# Patient Record
Sex: FEMALE | Race: WHITE | NOT HISPANIC OR LATINO | Employment: PART TIME | URBAN - METROPOLITAN AREA
[De-identification: names, ages, dates, MRNs, and addresses within clinical notes are randomized per-mention and may not be internally consistent; named-entity substitution may affect disease eponyms.]

---

## 2017-10-31 ENCOUNTER — LAB VISIT (OUTPATIENT)
Dept: LAB | Facility: HOSPITAL | Age: 62
End: 2017-10-31
Attending: INTERNAL MEDICINE
Payer: COMMERCIAL

## 2017-10-31 DIAGNOSIS — D72.819 LEUKOPENIA, UNSPECIFIED TYPE: ICD-10-CM

## 2017-10-31 LAB
ALBUMIN SERPL BCP-MCNC: 4.6 G/DL
ALP SERPL-CCNC: 47 U/L
ALT SERPL W/O P-5'-P-CCNC: 42 U/L
ANION GAP SERPL CALC-SCNC: 9 MMOL/L
AST SERPL-CCNC: 27 U/L
BASOPHILS # BLD AUTO: 0.04 K/UL
BASOPHILS NFR BLD: 0.9 %
BILIRUB SERPL-MCNC: 0.7 MG/DL
BUN SERPL-MCNC: 17 MG/DL
CALCIUM SERPL-MCNC: 10.6 MG/DL
CHLORIDE SERPL-SCNC: 99 MMOL/L
CO2 SERPL-SCNC: 29 MMOL/L
CREAT SERPL-MCNC: 0.44 MG/DL
DIFFERENTIAL METHOD: ABNORMAL
EOSINOPHIL # BLD AUTO: 0.1 K/UL
EOSINOPHIL NFR BLD: 2 %
ERYTHROCYTE [DISTWIDTH] IN BLOOD BY AUTOMATED COUNT: 12 %
EST. GFR  (AFRICAN AMERICAN): >60 ML/MIN/1.73 M^2
EST. GFR  (NON AFRICAN AMERICAN): >60 ML/MIN/1.73 M^2
GLUCOSE SERPL-MCNC: 101 MG/DL
HCT VFR BLD AUTO: 41.1 %
HGB BLD-MCNC: 13.9 G/DL
LYMPHOCYTES # BLD AUTO: 1.6 K/UL
LYMPHOCYTES NFR BLD: 35.4 %
MCH RBC QN AUTO: 32.4 PG
MCHC RBC AUTO-ENTMCNC: 33.8 G/DL
MCV RBC AUTO: 96 FL
MONOCYTES # BLD AUTO: 0.5 K/UL
MONOCYTES NFR BLD: 11.8 %
NEUTROPHILS # BLD AUTO: 2.3 K/UL
NEUTROPHILS NFR BLD: 49.9 %
NRBC BLD-RTO: 0 /100 WBC
PLATELET # BLD AUTO: 161 K/UL
PMV BLD AUTO: 10.8 FL
POTASSIUM SERPL-SCNC: 4.2 MMOL/L
PROT SERPL-MCNC: 7.8 G/DL
RBC # BLD AUTO: 4.29 M/UL
SODIUM SERPL-SCNC: 137 MMOL/L
TSH SERPL DL<=0.005 MIU/L-ACNC: 0.49 UIU/ML
VIT B12 SERPL-MCNC: >1000 PG/ML
WBC # BLD AUTO: 4.57 K/UL

## 2017-10-31 PROCEDURE — 82607 VITAMIN B-12: CPT | Mod: PN

## 2017-10-31 PROCEDURE — 85025 COMPLETE CBC W/AUTO DIFF WBC: CPT | Mod: PN

## 2017-10-31 PROCEDURE — 80053 COMPREHEN METABOLIC PANEL: CPT | Mod: PN

## 2017-10-31 PROCEDURE — 82607 VITAMIN B-12: CPT

## 2017-10-31 PROCEDURE — 85025 COMPLETE CBC W/AUTO DIFF WBC: CPT

## 2017-10-31 PROCEDURE — 84443 ASSAY THYROID STIM HORMONE: CPT

## 2017-10-31 PROCEDURE — 84443 ASSAY THYROID STIM HORMONE: CPT | Mod: PN

## 2017-10-31 PROCEDURE — 36415 COLL VENOUS BLD VENIPUNCTURE: CPT | Mod: PN

## 2017-10-31 PROCEDURE — 80053 COMPREHEN METABOLIC PANEL: CPT

## 2017-11-02 ENCOUNTER — TELEPHONE (OUTPATIENT)
Dept: CARDIOLOGY | Facility: CLINIC | Age: 62
End: 2017-11-02

## 2017-11-02 NOTE — TELEPHONE ENCOUNTER
----- Message from Juana Bueno RT sent at 11/2/2017  3:32 PM CDT -----  Contact: pt    pt , requesting a call back soon concerning an appt she thought she was suppose to have with Dr. Oseguera, 11/06/2017, Monday, thanks.

## 2020-08-13 ENCOUNTER — OFFICE VISIT (OUTPATIENT)
Dept: ORTHOPEDICS | Facility: CLINIC | Age: 65
End: 2020-08-13
Payer: COMMERCIAL

## 2020-08-13 VITALS — SYSTOLIC BLOOD PRESSURE: 144 MMHG | WEIGHT: 130 LBS | DIASTOLIC BLOOD PRESSURE: 76 MMHG | BODY MASS INDEX: 22.31 KG/M2

## 2020-08-13 DIAGNOSIS — S73.191A TEAR OF RIGHT ACETABULAR LABRUM, INITIAL ENCOUNTER: Primary | ICD-10-CM

## 2020-08-13 PROCEDURE — 99203 PR OFFICE/OUTPT VISIT, NEW, LEVL III, 30-44 MIN: ICD-10-PCS | Mod: S$GLB,,, | Performed by: ORTHOPAEDIC SURGERY

## 2020-08-13 PROCEDURE — 3008F PR BODY MASS INDEX (BMI) DOCUMENTED: ICD-10-PCS | Mod: S$GLB,,, | Performed by: ORTHOPAEDIC SURGERY

## 2020-08-13 PROCEDURE — 99203 OFFICE O/P NEW LOW 30 MIN: CPT | Mod: S$GLB,,, | Performed by: ORTHOPAEDIC SURGERY

## 2020-08-13 PROCEDURE — 3008F BODY MASS INDEX DOCD: CPT | Mod: S$GLB,,, | Performed by: ORTHOPAEDIC SURGERY

## 2020-08-13 RX ORDER — HYDROCHLOROTHIAZIDE 25 MG/1
25 TABLET ORAL DAILY
COMMUNITY
Start: 2020-06-20 | End: 2022-08-01

## 2020-08-13 RX ORDER — ESTRADIOL 0.75 MG/.75G
GEL TOPICAL
COMMUNITY
Start: 2020-08-12

## 2020-08-13 RX ORDER — POTASSIUM CHLORIDE 750 MG/1
10 TABLET, FILM COATED, EXTENDED RELEASE ORAL DAILY
COMMUNITY
Start: 2020-06-11 | End: 2022-08-01

## 2020-08-13 RX ORDER — ATORVASTATIN CALCIUM 40 MG/1
40 TABLET, FILM COATED ORAL DAILY
COMMUNITY
Start: 2020-07-11 | End: 2023-07-18

## 2020-08-13 NOTE — PROGRESS NOTES
[facility]    Subjective:     Chief Complaint:   Chief Complaint   Patient presents with    Right Hip - Pain     Right hip pain x 3 weeks. Noinjury       Past Medical History:   Diagnosis Date    Hyperlipemia        Past Surgical History:   Procedure Laterality Date    APPENDECTOMY         Current Outpatient Medications   Medication Sig    atorvastatin (LIPITOR) 40 MG tablet Take 40 mg by mouth once daily.    cyanocobalamin (VITAMIN B-12) 2000 MCG tablet Take 2,000 mcg by mouth once daily.    DIVIGEL 0.75 mg/0.75 gram (0.1%) GlPk     hydroCHLOROthiazide (HYDRODIURIL) 25 MG tablet Take 25 mg by mouth once daily.    KLOR-CON 10 10 mEq TbSR Take 10 mEq by mouth once daily.    magnesium oxide 400 mg Cap Take 1 tablet by mouth once daily.    multivitamin (ONE DAILY MULTIVITAMIN) per tablet Take 1 tablet by mouth once daily.    progesterone (PROMETRIUM) 100 MG capsule Take 100 mg by mouth once daily.    testosterone (ANDROGEL) 1 % (50 mg/5 gram) GlPk Apply topically once daily. Compound-Patient did not bring meds with her wasn't sure of strength.     No current facility-administered medications for this visit.        Review of patient's allergies indicates:  No Known Allergies    Family History   Problem Relation Age of Onset    Cancer Father     Cancer Paternal Aunt     Cancer Paternal Uncle        Social History     Socioeconomic History    Marital status:      Spouse name: Not on file    Number of children: Not on file    Years of education: Not on file    Highest education level: Not on file   Occupational History    Not on file   Social Needs    Financial resource strain: Not on file    Food insecurity     Worry: Not on file     Inability: Not on file    Transportation needs     Medical: Not on file     Non-medical: Not on file   Tobacco Use    Smoking status: Never Smoker    Smokeless tobacco: Never Used   Substance and Sexual Activity    Alcohol use: Yes      Alcohol/week: 2.0 - 3.0 standard drinks     Types: 2 - 3 Glasses of wine per week    Drug use: No    Sexual activity: Not on file   Lifestyle    Physical activity     Days per week: Not on file     Minutes per session: Not on file    Stress: Not on file   Relationships    Social connections     Talks on phone: Not on file     Gets together: Not on file     Attends Christianity service: Not on file     Active member of club or organization: Not on file     Attends meetings of clubs or organizations: Not on file     Relationship status: Not on file   Other Topics Concern    Not on file   Social History Narrative    Not on file       History of present illness:  Patient comes in today for the right hip.  She has had right hip pain for several months.  She initially thought she injured her back but now she is having severe groin pain.  She denies any recent trauma.      Review of Systems:    Constitution: Negative for chills, fever, and sweats.  Negative for unexplained weight loss.    HENT:  Negative for headaches and blurry vision.    Cardiovascular:Negative for chest pain or irregular heart beat. Negative for hypertension.    Respiratory:  Negative for cough and shortness of breath.    Gastrointestinal: Negative for abdominal pain, heartburn, melena, nausea, and vomitting.    Genitourinary:  Negative bladder incontinence and dysuria.    Musculoskeletal:  See HPI for details.     Neurological: Negative for numbness.    Psychiatric/Behavioral: Negative for depression.  The patient is not nervous/anxious.      Endocrine: Negative for polyuria    Hematologic/Lymphatic: Negative for bleeding problem.  Does not bruise/bleed easily.    Skin: Negative for poor would healing and rash    Objective:      Physical Examination:    Vital Signs:    Vitals:    08/13/20 1104   BP: (!) 144/76       Body mass index is 22.31 kg/m².    This a well-developed, well nourished patient in no acute distress.  They are alert and oriented  and cooperative to examination.        Patient has limited range of motion of the right hip.  She has a flexion contracture 15°.  She has full flexion.  She has severe pain with internal-external rotation of the right hip in the groin.  Negative straight leg raises.  EHL is intact deep tendon reflexes are intact.  Full range of motion of the left hip without difficulty.  Dorsalis pedis is 2/2 and equal bilaterally  Pertinent New Results:    XRAY Report / Interpretation:   AP and lateral the right hip demonstrates mild osteoarthritis of the right hip    Assessment/Plan:      Labral tear right hip.  I have ordered an MRI to better look at the labrum.  I will see her back with that the      This note was created using Dragon voice recognition software that occasionally misinterpreted phrases or words.

## 2020-08-19 ENCOUNTER — HOSPITAL ENCOUNTER (OUTPATIENT)
Dept: RADIOLOGY | Facility: HOSPITAL | Age: 65
Discharge: HOME | End: 2020-08-19
Attending: ORTHOPAEDIC SURGERY
Payer: COMMERCIAL

## 2020-08-19 DIAGNOSIS — S73.191A TEAR OF RIGHT ACETABULAR LABRUM, INITIAL ENCOUNTER: ICD-10-CM

## 2020-08-19 PROCEDURE — 73721 MRI JNT OF LWR EXTRE W/O DYE: CPT | Mod: TC,RT

## 2020-08-20 ENCOUNTER — OFFICE VISIT (OUTPATIENT)
Dept: ORTHOPEDICS | Facility: CLINIC | Age: 65
End: 2020-08-20
Payer: COMMERCIAL

## 2020-08-20 VITALS
DIASTOLIC BLOOD PRESSURE: 78 MMHG | WEIGHT: 130 LBS | HEIGHT: 64 IN | HEART RATE: 69 BPM | BODY MASS INDEX: 22.2 KG/M2 | SYSTOLIC BLOOD PRESSURE: 146 MMHG

## 2020-08-20 DIAGNOSIS — M67.951 TENDINOPATHY OF RIGHT GLUTEUS MEDIUS: Primary | ICD-10-CM

## 2020-08-20 DIAGNOSIS — M51.36 DISC DEGENERATION, LUMBAR: ICD-10-CM

## 2020-08-20 PROCEDURE — 99213 OFFICE O/P EST LOW 20 MIN: CPT | Mod: S$GLB,,, | Performed by: ORTHOPAEDIC SURGERY

## 2020-08-20 PROCEDURE — 3008F BODY MASS INDEX DOCD: CPT | Mod: S$GLB,,, | Performed by: ORTHOPAEDIC SURGERY

## 2020-08-20 PROCEDURE — 3008F PR BODY MASS INDEX (BMI) DOCUMENTED: ICD-10-PCS | Mod: S$GLB,,, | Performed by: ORTHOPAEDIC SURGERY

## 2020-08-20 PROCEDURE — 99213 PR OFFICE/OUTPT VISIT, EST, LEVL III, 20-29 MIN: ICD-10-PCS | Mod: S$GLB,,, | Performed by: ORTHOPAEDIC SURGERY

## 2020-08-20 RX ORDER — METHYLPREDNISOLONE 4 MG/1
TABLET ORAL
Qty: 1 PACKAGE | Refills: 0 | Status: SHIPPED | OUTPATIENT
Start: 2020-08-20 | End: 2023-07-18

## 2020-08-20 RX ORDER — TRAMADOL HYDROCHLORIDE 50 MG/1
50 TABLET ORAL EVERY 6 HOURS PRN
Qty: 28 TABLET | Refills: 0 | Status: SHIPPED | OUTPATIENT
Start: 2020-08-20 | End: 2020-08-27

## 2020-08-20 NOTE — PROGRESS NOTES
[facility]    Subjective:     Chief Complaint:   Chief Complaint   Patient presents with    Right Hip - Pain     Right hip pain/MRI results. States that her pain has gotten worse and that she is now having a pain that radiates down the front of her leg        Past Medical History:   Diagnosis Date    Hyperlipemia        Past Surgical History:   Procedure Laterality Date    APPENDECTOMY         Current Outpatient Medications   Medication Sig    atorvastatin (LIPITOR) 40 MG tablet Take 40 mg by mouth once daily.    cyanocobalamin (VITAMIN B-12) 2000 MCG tablet Take 2,000 mcg by mouth once daily.    DIVIGEL 0.75 mg/0.75 gram (0.1%) GlPk     hydroCHLOROthiazide (HYDRODIURIL) 25 MG tablet Take 25 mg by mouth once daily.    KLOR-CON 10 10 mEq TbSR Take 10 mEq by mouth once daily.    magnesium oxide 400 mg Cap Take 1 tablet by mouth once daily.    multivitamin (ONE DAILY MULTIVITAMIN) per tablet Take 1 tablet by mouth once daily.    progesterone (PROMETRIUM) 100 MG capsule Take 100 mg by mouth once daily.    testosterone (ANDROGEL) 1 % (50 mg/5 gram) GlPk Apply topically once daily. Compound-Patient did not bring meds with her wasn't sure of strength.     No current facility-administered medications for this visit.        Review of patient's allergies indicates:  No Known Allergies    Family History   Problem Relation Age of Onset    Cancer Father     Cancer Paternal Aunt     Cancer Paternal Uncle        Social History     Socioeconomic History    Marital status:      Spouse name: Not on file    Number of children: Not on file    Years of education: Not on file    Highest education level: Not on file   Occupational History    Not on file   Social Needs    Financial resource strain: Not on file    Food insecurity     Worry: Not on file     Inability: Not on file    Transportation needs     Medical: Not on file     Non-medical: Not on file   Tobacco Use    Smoking status: Never  Smoker    Smokeless tobacco: Never Used   Substance and Sexual Activity    Alcohol use: Yes     Alcohol/week: 2.0 - 3.0 standard drinks     Types: 2 - 3 Glasses of wine per week    Drug use: No    Sexual activity: Not on file   Lifestyle    Physical activity     Days per week: Not on file     Minutes per session: Not on file    Stress: Not on file   Relationships    Social connections     Talks on phone: Not on file     Gets together: Not on file     Attends Adventism service: Not on file     Active member of club or organization: Not on file     Attends meetings of clubs or organizations: Not on file     Relationship status: Not on file   Other Topics Concern    Not on file   Social History Narrative    Not on file       History of present illness:  Patient comes in today for the right hip.  Continues complain of severe right hip pain and pain going down her leg.  The pain is in the anterior aspect of the leg and does go below the knee.  She denies any back pain whatsoever.      Review of Systems:    Constitution: Negative for chills, fever, and sweats.  Negative for unexplained weight loss.    HENT:  Negative for headaches and blurry vision.    Cardiovascular:Negative for chest pain or irregular heart beat. Negative for hypertension.    Respiratory:  Negative for cough and shortness of breath.    Gastrointestinal: Negative for abdominal pain, heartburn, melena, nausea, and vomitting.    Genitourinary:  Negative bladder incontinence and dysuria.    Musculoskeletal:  See HPI for details.     Neurological: Negative for numbness.    Psychiatric/Behavioral: Negative for depression.  The patient is not nervous/anxious.      Endocrine: Negative for polyuria    Hematologic/Lymphatic: Negative for bleeding problem.  Does not bruise/bleed easily.    Skin: Negative for poor would healing and rash    Objective:      Physical Examination:    Vital Signs:    Vitals:    08/20/20 1107   BP: (!) 146/78   Pulse: 69        Body mass index is 22.31 kg/m².    This a well-developed, well nourished patient in no acute distress.  They are alert and oriented and cooperative to examination.        Patient has no pain to palpation along the thigh no masses.  She has negative straight leg raises.  She has severe pain with internal-external rotation of the right hip.  She has no pain to palpation along the lumbar spine.  Pertinent New Results:  MRI of the right hip is essentially normal.    AP and lateral of the lumbar spine demonstrates collapse of the L5-S1 disc interspace    Assessment/Plan:      Severe right hip pain.  Etiology is unclear.  Started her on a Medrol Dosepak and physical therapy.  I will see her back in 2 weeks.  I am extremely concerned that this pain is coming from her hip even though her MRI is negative.  Could consider a MRI with contrast.  Will check her back in 2 weeks      This note was created using Dragon voice recognition software that occasionally misinterpreted phrases or words.

## 2022-02-08 ENCOUNTER — IMMUNIZATION (OUTPATIENT)
Dept: PRIMARY CARE CLINIC | Facility: CLINIC | Age: 67
End: 2022-02-08
Payer: COMMERCIAL

## 2022-02-08 DIAGNOSIS — Z23 NEED FOR VACCINATION: Primary | ICD-10-CM

## 2022-02-08 PROCEDURE — 91305 COVID-19, MRNA, LNP-S, PF, 30 MCG/0.3 ML DOSE VACCINE (PFIZER): CPT | Mod: PBBFAC | Performed by: INTERNAL MEDICINE

## 2022-11-22 PROBLEM — D47.2 MONOCLONAL GAMMOPATHY: Status: ACTIVE | Noted: 2022-11-22

## 2022-11-22 PROBLEM — R80.9 PROTEINURIA: Status: ACTIVE | Noted: 2022-11-22

## 2022-11-22 PROBLEM — E83.52 HYPERCALCEMIA: Status: ACTIVE | Noted: 2022-11-22

## 2022-11-22 PROBLEM — M85.80 OSTEOPENIA: Status: ACTIVE | Noted: 2022-11-22

## 2024-05-16 DIAGNOSIS — C50.911 INVASIVE DUCTAL CARCINOMA OF BREAST, FEMALE, RIGHT: Primary | ICD-10-CM

## 2024-05-16 NOTE — PROGRESS NOTES
[facility] - Breast Surgery   History and Physical    Subjective:      Nery Sandoval is a 68 y.o. female     Found on MMG, no symptoms      05/16/24 Genetics collected during Dr. Powell consult    Relevant medical history:  Monoclonal Gammopathy - Dr. Gasca   Proteinuria -  Dr. Kebede   Prometrium, Testosterone and Divigel x 20 years- stopped with diagnosis    Relevant info:Patient  Javier is with patient at today's appt.       Family history of cancer:Paternal aunt breast cancer. Two of patient paternal Uncle colon cancer.           Menarche at age 16 years old.  LMP 40ish. OCP-20 years. Hysterectomy no  Menopause at 42ish. HRT-yes estrogen progesterone 20 years   Personal history of ovarian/breast none . Denies previous breast biopsy. Patient had skin cancer before.         Never Smoker   4 Covid-19 vaccine left arm        Patient Active Problem List   Diagnosis    Proteinuria    Hypercalcemia    Osteopenia    Invasive ductal carcinoma of breast, female, right     Current Outpatient Medications on File Prior to Visit   Medication Sig Dispense Refill    calcitonin, salmon, (FORTICAL) 200 unit/actuation nasal spray 1 SPRAY BY NASAL ROUTE ONCE DAILY. ALTERNATE NOSTRILS 11.1 each 3    losartan (COZAAR) 100 MG tablet Take 100 mg by mouth once daily.      rosuvastatin (CRESTOR) 40 MG Tab Take 10 mg by mouth every evening.      CMPD testosterone proprionate 2% in vanicream  (Patient not taking: Reported on 5/20/2024)      COVID-19 vac, andra,Pfizer,,PF, (PFIZER COVID-19) 30 mcg/0.3 mL injection [email or web address] COVID-19 Vaccine (PF) 30 mcg/0.3 mL IM susp (purple)   Inject by intramuscular route. (Patient not taking: Reported on 5/20/2024)      cyanocobalamin 2000 MCG tablet Take 2,000 mcg by mouth once daily. (Patient not taking: Reported on 5/20/2024)      DIVIGEL 0.75 mg/0.75 gram (0.1%) GlPk  (Patient not taking: Reported on 5/20/2024)      magnesium oxide 400 mg Cap Take 1 tablet by mouth  once daily. (Patient not taking: Reported on 5/20/2024)      MIRALAX 17 gram/dose powder Take by mouth. (Patient not taking: Reported on 5/20/2024)      pantoprazole (PROTONIX) 40 MG tablet Take 40 mg by mouth once daily. (Patient not taking: Reported on 5/20/2024)      progesterone (PROMETRIUM) 200 MG capsule Take 200 mg by mouth once daily. (Patient not taking: Reported on 5/20/2024)      scopolamine (TRANSDERM-SCOP) 1.3-1.5 mg (1 mg over 3 days) PLACE 1 PATCH ONTO THE SKIN EVERY THIRD DAY. (Patient not taking: Reported on 5/20/2024)      testosterone (ANDROGEL) 1 % (50 mg/5 gram) GlPk Apply topically once daily. Compound-Patient did not bring meds with her wasn't sure of strength. (Patient not taking: Reported on 5/20/2024)      valACYclovir (VALTREX) 1000 MG tablet Take 1,000 mg by mouth 3 (three) times daily. (Patient not taking: Reported on 5/20/2024)       Current Facility-Administered Medications on File Prior to Visit   Medication Dose Route Frequency Provider Last Rate Last Admin    LIDOcaine-EPINEPHrine (PF) 1%-1:200,000 injection 5 mL  5 mL Subcutaneous Once Bhumika Hernandez MD         Review of patient's allergies indicates:  No Known Allergies  Past Medical History:   Diagnosis Date    Hyperlipemia      Past Surgical History:   Procedure Laterality Date    APPENDECTOMY       Family History   Problem Relation Name Age of Onset    Cancer Father          colon    Cancer Paternal Aunt          breast    Cancer Paternal Uncle          colon     Social History     Socioeconomic History    Marital status:    Tobacco Use    Smoking status: Never    Smokeless tobacco: Never   Substance and Sexual Activity    Alcohol use: Yes     Alcohol/week: 2.0 - 3.0 standard drinks of alcohol     Types: 2 - 3 Glasses of wine per week    Drug use: No         Review of Systems    No CP, No SOB      Objective:      /76 (BP Location: Left arm, Patient Position: Sitting, BP Method: Medium (Automatic))   Pulse 65   " Temp 97.3 °F (36.3 °C) (Temporal)   Resp 16   Ht 5' 4" (1.626 m)   Wt 61.8 kg (136 lb 3.9 oz)   SpO2 100%   BMI 23.39 kg/m²     Constitutional: NAD AAOX4  HEENT: EOMI, nonicteric sclera  NECK: no mass, no thyromegaly, no lymphadenopathy  CV: regular rate  PULM: good respiratory effort  ABDOMEN: soft, Nontender, nondistended. No guarding. No rebound.  MUSCULOSKELETAL: Good ROM  SKIN: No rashes or ulcerations seen.   NEUROLOGIC: CN grossly intact. Motor, sensory grossly intact    Breast exam   D moderate ptosis  Right: No discharge, dimpling, lymphadenopathy  Post biopsy bruise. Right UOQ 2cm FN 2cm movable bruise area  Left:  No mass, discharge, dimpling, lymphadenopathy      No visits with results within 6 Week(s) from this visit.   Latest known visit with results is:   Lab Visit on 04/04/2024   Component Date Value Ref Range Status    Glucose 04/04/2024 93  70 - 110 mg/dL Final    Comment: The ADA recommends the following guidelines for fasting glucose:    Normal:       less than 100 mg/dL    Prediabetes:  100 mg/dL to 125 mg/dL    Diabetes:     126 mg/dL or higher      Sodium 04/04/2024 136  136 - 145 mmol/L Final    Potassium 04/04/2024 4.1  3.5 - 5.1 mmol/L Final    Anion Gap reference range revised on 4/28/2023    Chloride 04/04/2024 105  95 - 110 mmol/L Final    CO2 04/04/2024 23  22 - 31 mmol/L Final    BUN 04/04/2024 15  7 - 18 mg/dL Final    Calcium 04/04/2024 10.9 (H)  8.4 - 10.2 mg/dL Final    Creatinine 04/04/2024 0.57  0.50 - 1.40 mg/dL Final    Albumin 04/04/2024 4.2  3.5 - 5.2 g/dL Final    Phosphorus 04/04/2024 3.1  2.7 - 4.5 mg/dL Final    eGFR 04/04/2024 >60  >60 mL/min/1.73 m^2 Final    Anion Gap 04/04/2024 8  5 - 12 mmol/L Final    Anion Gap reference range revised on 4/28/2023    WBC 04/04/2024 4.34  3.90 - 12.70 K/uL Final    RBC 04/04/2024 3.94 (L)  4.00 - 5.40 M/uL Final    Hemoglobin 04/04/2024 12.5  12.0 - 16.0 g/dL Final    Hematocrit 04/04/2024 37.5  37.0 - 48.5 % Final    MCV " 04/04/2024 95  82 - 98 fL Final    MCH 04/04/2024 31.7 (H)  27.0 - 31.0 pg Final    MCHC 04/04/2024 33.3  32.0 - 36.0 g/dL Final    RDW 04/04/2024 12.0  11.5 - 14.5 % Final    Platelets 04/04/2024 156  150 - 450 K/uL Final    MPV 04/04/2024 10.5  9.2 - 12.9 fL Final    Immature Granulocytes 04/04/2024 0.2  0.0 - 0.5 % Final    Gran # (ANC) 04/04/2024 2.1  1.8 - 7.7 K/uL Final    Immature Grans (Abs) 04/04/2024 0.01  0.00 - 0.04 K/uL Final    Comment: Mild elevation in immature granulocytes is non specific and   can be seen in a variety of conditions including stress response,   acute inflammation, trauma and pregnancy. Correlation with other   laboratory and clinical findings is essential.      Lymph # 04/04/2024 1.7  1.0 - 4.8 K/uL Final    Mono # 04/04/2024 0.5  0.3 - 1.0 K/uL Final    Eos # 04/04/2024 0.1  0.0 - 0.5 K/uL Final    Baso # 04/04/2024 0.03  0.00 - 0.20 K/uL Final    nRBC 04/04/2024 0  0 /100 WBC Final    Gran % 04/04/2024 48.4  38.0 - 73.0 % Final    Lymph % 04/04/2024 38.5  18.0 - 48.0 % Final    Mono % 04/04/2024 10.8  4.0 - 15.0 % Final    Eosinophil % 04/04/2024 1.4  0.0 - 8.0 % Final    Basophil % 04/04/2024 0.7  0.0 - 1.9 % Final    Differential Method 04/04/2024 Automated   Final    Protein, Urine Random 04/04/2024 36 (H)  0 - 11 mg/dL Final    Creatinine, Urine 04/04/2024 15.1  15.0 - 325.0 mg/dL Final    Prot/Creat Ratio, Urine 04/04/2024 2384.1 (H)  10.0 - 107.0 mg/g Final    Specimen UA 04/04/2024 Urine, Clean Catch   Final    Color, UA 04/04/2024 Yellow  Yellow, Straw, Flor Final    Appearance, UA 04/04/2024 Clear  Clear Final    pH, UA 04/04/2024 6.5  5.0 - 8.0 Final    Specific Gravity, UA 04/04/2024 <=1.005 (A)  1.005 - 1.030 Final    Protein, UA 04/04/2024 Negative  Negative Final    Comment: Recommend a 24 hour urine protein or a urine   protein/creatinine ratio if globulin induced proteinuria is  clinically suspected.      Glucose, UA 04/04/2024 Negative  Negative Final    Ketones,  UA 04/04/2024 Negative  Negative Final    Bilirubin (UA) 04/04/2024 Negative  Negative Final    Occult Blood UA 04/04/2024 Negative  Negative Final    Nitrite, UA 04/04/2024 Negative  Negative Final    Urobilinogen, UA 04/04/2024 0.2  <2.0 EU/dL Final    Leukocytes, UA 04/04/2024 Negative  Negative Final    Magnesium 04/04/2024 2.2  1.6 - 2.6 mg/dL Final    Total Protein, Electrophoresis 04/04/2024 6.9  6.3 - 8.2 g/dL Final    Albumin, Electrophoresis 04/04/2024 4.24  3.75 - 5.01 g/dL Final    Alpha 1-Globulin, Electrophoresis 04/04/2024 0.28  0.19 - 0.46 g/dL Final    Alpha 2-Globulin, Electrophoresis 04/04/2024 0.72  0.48 - 1.05 g/dL Final    Beta-Globulin, Electrophoresis 04/04/2024 0.81  0.48 - 1.10 g/dL Final    Gamma-Globulin, Electrophoresis 04/04/2024 0.86  0.62 - 1.51 g/dL Final    Immunofix Interp. 04/04/2024 AMAYA Done   Final    SPE Interp. 04/04/2024 See Note   Final    Comment: Restricted band of protein migration in the gamma region   which is too small to quantify. AMAYA gel shows a faint band   in IgM kappa which may be indicative of a specific immune   response or an early monoclonal protein. Close clinical   correlation with AMAYA follow-up is suggested, if clinically   indicated.      MONOCLONAL PROTEIN 04/04/2024 Not Applicable  <=0.00 g/dL Final    EER Monoclonal Protein Detect 04/04/2024 See Note   Final    Comment: Authorized individuals can access the Dopplr   Enhanced Report using the following link:     [email or web address]  Performed By: [facility]  [address] [place] [address]  : Alexander Bustamante MD, PhD  CLIA Number: [id number]      [place] Free Light Chains 04/04/2024 16.66  3.30 - 19.40 mg/L Final    Comment: INTERPRETIVE INFORMATION: Kappa Qnt Free Light Chains  Undetected antigen excess is a rare event but cannot be   excluded. Free light chain results should always be   interpreted in conjunction with other clinical and    laboratory findings.      Lambda Free Light Chains 04/04/2024 10.24  5.71 - 26.30 mg/L Final    Comment: INTERPRETIVE INFORMATION: Lambda Qnt Free Light Chains  Undetected antigen excess is a rare event but cannot be   excluded. Free light chain results should always be   interpreted in conjunction with other clinical and   laboratory findings.      Kappa/Lambda FLC Ratio 04/04/2024 1.63  0.26 - 1.65 Final    Comment: Performed By: [facility]  [address] [place] [id number]  : Alexander Bustamante MD, PhD  CLIA Number: [id number]           Patient Active Problem List   Diagnosis    Proteinuria    Hypercalcemia    Osteopenia    Invasive ductal carcinoma of breast, female, right        High complexity of problems addressed by Dr. Eastman - breast cancer, treatment options, expectations, effects of treatment, risks, benefits. Extensive and complex data reviewed, independent interpretation of tests, discussion of management with another health care provider.    05/20/2024 reviewed all complex data.  MA/NP functioned as a scribe.  Services and exam were personally performed, decisions made, complex data reviewed by Dr. Eastman.      Alternative efficacious methods of treatment of breast cancer were given.  Options including lumpectomy, mastectomy, reconstruction options with advantages/disadvantages of each, provisions assuring coverage of reconstructive surgery costs, how the patient may access reconstructive care including the potential to be transferred to a facility that provides reconstructive care or choosing reconstruction after completion of breast cancer surgery and treatment.  LDH, LCLTF breast cancer brochure given.    Evidence based NCCN and MD Sandoval guidelines used for treatment planning.     Saw in Multi Disciplinary clinic with Dr Carver medical oncologist.    I have given her all options - lumpectomy XRT, unilateral or bilateral mastectomy +/- reconstruction. I  have explained procedure, risks, benefits, postop expectations. All questions answered. Risks include but are not limited to infection, bleeding, injury to nerves, vessels, numbness, weakness, difficulty lifting arm, swelling of arm (lymphedema), inability to remove all lesion, residual breast tissue, recurrence of malignancy, need for further procedure, loss of skin flap, seroma (fluid collection), inability to find sentinel lymph node, need for axillary dissection, chronic pain, radioactive substance may be given, allergic reaction, staining of the skin, difficulty with future imaging, close or positive margins requiring additional surgery, vascular clotting, pulmonary embolus.            Imaging and Chronology:     3/1/2023 Bilateral Screening Mammo - [facility]   No mammographic evidence of malignancy.   BI-RADS Category: Overall: 1 - Negative     4/23/2024 Bilateral Screening Mammo - [facility]   Left Nml   Right breast calcifications at the upper outer anterior position. Assessment: 0 - Incomplete. Special Views: Magnification View is recommended.    BI-RADS Category: Overall: 0 - Incomplete: Needs Additional Imaging Evaluation     5/1/2024 Right Dx Mammo, Right Complete US - [facility]   Mammo Digital Diagnostic:  Right 9:00 anterior depth, there is a group of pleomorphic calcifications spanning 4 mm, possibly a calcifying papilloma.  There are other milk of calcium calcifications in the right upper outer anterior breast elsewhere.     US Breast Right Complete  There is retroareolar duct ectasia.  There is a cyst at the right 9:00, 7 cm from the nipple, measuring 4 x 3 x 4 mm.     At the right 9:00, 2 cm from the nipple, there is an intraductal heterogeneous mass with associated calcifications measuring 5 x 3 x 4 mm, which corresponds to the mammographic finding. Biopsy recommended.      No other sonographic suspicious lesions are seen. No abnormal lymph nodes are seen in the axilla.      BI-RADS Category: Overall: 4 - Suspicious      5/8/2024 Right Breast 9 O'C, 2 CFN, CNB, Infinity Clip - [facility]  Grade 1 IDC (1,1,1)  DCIS Intermediate Grade     ER 93.8    SD  93.2    HER2 (1+) neg Ki67  13.7      5/15/2024 MRI Breast - [facility]   At the right 10:00 anterior depth, there is irregular enhancement lateral adjacent to a biopsy clip measuring 5 x 3 x 2 mm with vague, subtle, below threshold enhancement inferior adjacent to the clip measuring 9 x 7 mm, which may be an underestimation of the extent of disease.  During the biopsy, the suspicious area seemed larger. Because the malignancy is low grade, it may not enhance. Recommend wide excision.      BI-RADS Category: Overall: 6 - Known Biopsy-Proven Malignancy          Assessment/Plan:      Cancer Staging   Invasive ductal carcinoma of breast, female, right  Staging form: Breast, AJCC 8th Edition  - Clinical stage from 5/9/2024: Stage IA (cT1a, cN0, cM0, G1, ER+, SD+, HER2-) - Signed by Joy Eastman MD on 5/20/2024    Right 10:00 upper outer quadrant anterior depth 5 mm enhancement, inferior adjacent there is a 9 mm enhancement.  At the time of the biopsy the suspicious area seem larger.  Recommend wide excision    Grade 1 IDC ER 94 SD 93 HER2 1+ negative Ki 14    Per Dr Hernandez breast rad  At the right 10:00 anterior depth, there is irregular enhancement lateral adjacent to a biopsy clip measuring 5 x 3 x 2 mm with vague, subtle, below threshold enhancement inferior adjacent to the clip measuring 9 x 7 mm, which may be an underestimation of the extent of disease.  During the biopsy, the suspicious area seemed larger. Because the malignancy is low grade, it may not enhance. Recommend wide excision. Discussed this with the pt and .     Has all surgical options, lumpectomy, sentinel node, mastectomy/reconstruction    Genetics pending with Dr Powell ofc    Stop all HRT  Pt needs after June 11-12 6/20/24 main OR right reena lump, right SLNB. 2nd case if possible  Rad onc gerardo    Offered for her  to cont to see Dr Gasca for this but she prefers Dr Carver (female) for her breast cancer and likely will cont with Dr Gasca for her monoclonal gammopathy    Addendum 6/3/24  Tumor conf  Agree with plans  Clips for carrillo

## 2024-05-20 ENCOUNTER — DOCUMENTATION ONLY (OUTPATIENT)
Dept: SURGERY | Facility: CLINIC | Age: 69
End: 2024-05-20

## 2024-05-20 ENCOUNTER — OFFICE VISIT (OUTPATIENT)
Dept: HEMATOLOGY/ONCOLOGY | Facility: CLINIC | Age: 69
End: 2024-05-20
Payer: MEDICARE

## 2024-05-20 ENCOUNTER — OFFICE VISIT (OUTPATIENT)
Dept: SURGERY | Facility: CLINIC | Age: 69
End: 2024-05-20
Payer: MEDICARE

## 2024-05-20 ENCOUNTER — TELEPHONE (OUTPATIENT)
Dept: HEMATOLOGY/ONCOLOGY | Facility: CLINIC | Age: 69
End: 2024-05-20

## 2024-05-20 VITALS
DIASTOLIC BLOOD PRESSURE: 76 MMHG | HEIGHT: 64 IN | HEIGHT: 64 IN | TEMPERATURE: 97 F | SYSTOLIC BLOOD PRESSURE: 132 MMHG | SYSTOLIC BLOOD PRESSURE: 132 MMHG | DIASTOLIC BLOOD PRESSURE: 76 MMHG | OXYGEN SATURATION: 100 % | BODY MASS INDEX: 23.26 KG/M2 | WEIGHT: 136.25 LBS | OXYGEN SATURATION: 100 % | WEIGHT: 136.25 LBS | RESPIRATION RATE: 16 BRPM | HEART RATE: 65 BPM | BODY MASS INDEX: 23.26 KG/M2 | HEART RATE: 65 BPM | RESPIRATION RATE: 16 BRPM | TEMPERATURE: 97 F

## 2024-05-20 DIAGNOSIS — C50.911 INVASIVE DUCTAL CARCINOMA OF BREAST, FEMALE, RIGHT: Primary | ICD-10-CM

## 2024-05-20 DIAGNOSIS — M85.80 OSTEOPENIA, UNSPECIFIED LOCATION: Primary | ICD-10-CM

## 2024-05-20 DIAGNOSIS — C50.911 INVASIVE DUCTAL CARCINOMA OF BREAST, FEMALE, RIGHT: ICD-10-CM

## 2024-05-20 PROBLEM — D47.2 MONOCLONAL GAMMOPATHY: Status: RESOLVED | Noted: 2022-11-22 | Resolved: 2024-05-20

## 2024-05-20 PROCEDURE — 99215 OFFICE O/P EST HI 40 MIN: CPT | Mod: PBBFAC,27,PN | Performed by: SURGERY

## 2024-05-20 PROCEDURE — 99999 PR PBB SHADOW E&M-EST. PATIENT-LVL V: CPT | Mod: PBBFAC,,, | Performed by: SURGERY

## 2024-05-20 PROCEDURE — 99213 OFFICE O/P EST LOW 20 MIN: CPT | Mod: PBBFAC,PN | Performed by: INTERNAL MEDICINE

## 2024-05-20 PROCEDURE — 99999 PR PBB SHADOW E&M-EST. PATIENT-LVL III: CPT | Mod: PBBFAC,,, | Performed by: INTERNAL MEDICINE

## 2024-05-20 PROCEDURE — 99205 OFFICE O/P NEW HI 60 MIN: CPT | Mod: S$PBB,,, | Performed by: INTERNAL MEDICINE

## 2024-05-20 PROCEDURE — 99205 OFFICE O/P NEW HI 60 MIN: CPT | Mod: S$PBB,,, | Performed by: SURGERY

## 2024-05-20 NOTE — PROGRESS NOTES
Name: Nery Sandoval  MRN:  [id number]  :  1955 Age 68 y.o.  Date of Service: 2024    Reason for visit:  Nery Sandoval is a 68 y.o. female here regarding...    #Right Invasive Ductal Carcinoma   Date of Original Diagnosis: 24  Original Stage: cT1N0 Stage 1A ER 93% AL 93% HER2 0/negative; Ki-67 13%, low-grade  Current Sites of Disease:  Right breast  Current Goals of Therapy:  Curative  Current Therapy:  Pending    Oncologic History/History of Present Illness:     Detected by screening mammogram. No symptoms.     2024 diagnostic mammogram with ultrasound: right 9:00, 2 cm from the nipple, there is an intraductal heterogeneous mass with associated calcifications measuring 5 x 3 x 4 mm    Menarche: age 16  First Child : nulliparous, n/'a  Use of Systemic Hormonal Therapy:  yes for years.   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 Never smoker       Past Medical History:   Diagnosis Date    Hyperlipemia        Past Surgical History:   Procedure Laterality Date    APPENDECTOMY         Allergies as of 2024    (No Known Allergies)       Family History   Problem Relation Name Age of Onset    Cancer Father          colon    Cancer Paternal Aunt          breast    Cancer Paternal Uncle          colon       Social History     Tobacco Use    Smoking status: Never    Smokeless tobacco: Never   Substance Use Topics    Alcohol use: Yes     Alcohol/week: 2.0 - 3.0 standard drinks of alcohol     Types: 2 - 3 Glasses of wine per week    Drug use: No         PHYSICAL EXAMINATION:  There were no vitals taken for this visit.  Wt Readings from Last 3 Encounters:   24 59 kg (130 lb 1.1 oz)   24 59 kg (130 lb)   24 60.3 kg  (133 lb)     ECOG PERFORMANCE STATUS: 0  Physical Exam   General:  Well-appearing, nontoxic  Eyes:  Equal and round pupils, EOMI, no scleral icterus  Mouth:  No lesions, moist  Cardiovascular:  Warm, well-perfused, no peripheral edema  Lungs:  Unlabored on room air, no wheezing  Neurologic:  Awake, alert and oriented, participating in the exam  Psych:  Appropriate mood and affect  Skin:  Normal pallor, No rashes  Heme:  No petechiae, no purpura  Breasts: breasts appear normal, no suspicious masses, no skin or nipple changes or axillary nodes Biopsy bruising noted on right breast .      LABORATORY:  CBC  Lab Results   Component Value Date    WBC 4.34 04/04/2024    HGB 12.5 04/04/2024    HCT 37.5 04/04/2024    MCV 95 04/04/2024     04/04/2024         BMP  Lab Results   Component Value Date     04/04/2024    K 4.1 04/04/2024     04/04/2024    CO2 23 04/04/2024    BUN 15 04/04/2024    CREATININE 0.57 04/04/2024    CALCIUM 10.9 (H) 04/04/2024    ANIONGAP 8 04/04/2024    ESTGFRAFRICA >60 10/31/2017    EGFRNONAA >60 10/31/2017         PATHOLOGY:        RADIOLOGY:  DEXA scan 04/23/2024  Osteopenia      ASSESSMENT AND PLAN:  Nery Sandoval is a 68 y.o. female with...    #Right Invasive Ductal Carcinoma   Date of Original Diagnosis: 05/08/24  Original Stage: cT1N0 Stage 1A ER 93% CA 93% HER2 0/negative; Ki-67 13%, low-grade  Current Sites of Disease:  Right breast  Current Goals of Therapy:  Curative  Current Therapy:  Pending    I counseled the patient regarding the role of chemotherapy based on her oncotype score which is determined post operatively.  Will plan to see her back after surgical resection and oncotype results.     I counseled the patient regarding the stage of her disease in the role of adjuvant endocrine therapy therapy for hormone positive breast cancer.  I counseled the patient regarding the proposed endocrine therapy of anastrozole 1 mg once daily for a duration of 5 years to  prevent breast cancer recurrence in her ipsilateral breast and protect the contralateral breast.  I counseled the patient regarding the typical side effects of anastrozole including joint stiffness, hot flashes, vaginal dryness, bone demineralization.     Has been on progesterone, estrogen, and testosterone for a number of years, advised her to stop immediately, she voiced understanding.     #Bone Health  -patient will be on endocrine therapy x 5 years, baseline DEXA completed April 2024 with osteopenia, we will provide dental clearance form given anticipation of endocrine therapy    #Lipids- at risk for hyperlipidemia while on endocrine therapy; advise to continue routine PCP visits with lipid checks, already on a statin  # hypertension-on losartan, controlled, per PCP      Zahra Carver M.D.  Hematology/Oncology     Route Chart for Scheduling    Med Onc Chart Routing      Follow up with physician . Post surgery with pathology report and oncotype if cancer >5mm, per navigation   Follow up with [facility]    Infusion scheduling note    Injection scheduling note    Labs   Scheduling:  Preferred lab:  Lab interval:  No labs   Imaging   No imaging   Pharmacy appointment    Other referrals

## 2024-05-21 DIAGNOSIS — Z91.89 AT RISK FOR LYMPHEDEMA: ICD-10-CM

## 2024-05-21 DIAGNOSIS — C50.911 INVASIVE DUCTAL CARCINOMA OF BREAST, FEMALE, RIGHT: Primary | ICD-10-CM

## 2024-05-21 NOTE — NURSING
Patient was seen in multi disciplinary clinic today.  Explained my role as navigator.  Reviewed plan of care and answered questions. She was given a dental clearance form to have completed prior to starting Prolia.  Dr. Carver will see her after surgery.  My contact information was provided and she was encouraged to call with any questions or concerns.  She verbalized understanding.

## 2024-05-21 NOTE — PROGRESS NOTES
Met with patient during her consult with .  Patient and I reviewed the information she discussed with Dr. Eastman including treatment options, diagnosis, and future plans for workup and monitoring. Detailed information was discussed with the patient on lymphedema management, Integrative Oncology services offered, support groups and classes, genetics and hereditary cancers. Written copies were provided as well. Patient and I went through the NCCN patient guidelines book on Breast Cancer that was given to her, explained some of the information and why it is provided. Also given a copy of the [employer] brochure on Introductory Guide to Breast Cancer Treatment Options.  I have explained follow up appointments, surgical procedure, risks, benefits, postop instructions and expectations, she was also given a detailed copy of her post instructions and appointments, 's card and my card. She verbalized understanding of everything above and encouraged to call with any other questions or concerns   Patient had genetic testing done at Dr. Powell office last week

## 2024-05-22 ENCOUNTER — TELEPHONE (OUTPATIENT)
Dept: HEMATOLOGY/ONCOLOGY | Facility: CLINIC | Age: 69
End: 2024-05-22
Payer: MEDICARE

## 2024-05-22 NOTE — TELEPHONE ENCOUNTER
I spoke with the patient about getting an IO appt scheduled per referral. I explained in detail what we offer and listed some symptoms/side effects that we can help address using our support services. They verbalized understanding and accepted a date/time. Location was reviewed and a message was sent to the portal if they had any follow up questions.   ----- Message from Pura Gracia Patient Care Assistant sent at 5/22/2024  9:23 AM CDT -----  Type: Needs Medical Advice  Who Called:  radha Willson Call Back Number: [phone]      Additional Information: radha states Dr calhoun is wanting her  to make a appointment , please call to further discuss , thank you .

## 2024-05-23 ENCOUNTER — TELEPHONE (OUTPATIENT)
Dept: HEMATOLOGY/ONCOLOGY | Facility: CLINIC | Age: 69
End: 2024-05-23
Payer: MEDICARE

## 2024-05-23 NOTE — TELEPHONE ENCOUNTER
Spoke to pt to remind of appt tomorrow with Simran. Pt verbalized understanding and confirmed appt Location was discussed.

## 2024-05-24 ENCOUNTER — OFFICE VISIT (OUTPATIENT)
Dept: HEMATOLOGY/ONCOLOGY | Facility: CLINIC | Age: 69
End: 2024-05-24
Payer: MEDICARE

## 2024-05-24 VITALS
HEART RATE: 69 BPM | BODY MASS INDEX: 22.56 KG/M2 | WEIGHT: 132.13 LBS | SYSTOLIC BLOOD PRESSURE: 128 MMHG | OXYGEN SATURATION: 100 % | HEIGHT: 64 IN | DIASTOLIC BLOOD PRESSURE: 78 MMHG

## 2024-05-24 DIAGNOSIS — C50.911 INVASIVE DUCTAL CARCINOMA OF BREAST, FEMALE, RIGHT: ICD-10-CM

## 2024-05-24 DIAGNOSIS — D05.11 DUCTAL CARCINOMA IN SITU (DCIS) OF RIGHT BREAST: ICD-10-CM

## 2024-05-24 PROCEDURE — 99215 OFFICE O/P EST HI 40 MIN: CPT | Mod: S$PBB,,, | Performed by: NURSE PRACTITIONER

## 2024-05-24 PROCEDURE — 99214 OFFICE O/P EST MOD 30 MIN: CPT | Mod: PBBFAC,PN | Performed by: NURSE PRACTITIONER

## 2024-05-24 PROCEDURE — 99999 PR PBB SHADOW E&M-EST. PATIENT-LVL IV: CPT | Mod: PBBFAC,,, | Performed by: NURSE PRACTITIONER

## 2024-05-24 NOTE — PROGRESS NOTES
"Nery Sandoval  68 y.o. is here to seek an integrative approach to discuss side effects related to breast cancer treatment. Nery Sandoval  was referred by Dr. Eastman     HPI  Mrs. Sandoval reports she was diagnosed with breast cancer after a routine mammogram. She states, "I was blown away when Dr. Hernandez called me with the diagnosis." She states her world has been upside down the last 2 weeks with appointments and new information. She is handling the diagnosis well. She denies anxiety, but does have some stress with the increase in appointments.  She is sleeping well with 1/2 Unisom which she has taken for years. 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 She has a good appetite and follows a healthy diet. She was on estrogen, progesterone, and testosterone but stopped with the diagnosis.  She has not had any hot flashes, but does have a Veozah script in the event that they begin.   She has been  for 30 years and has 2 grown children and 4 grandsons. 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 She has a good support system in her family and friends.     Pillars Assessment    Sleep  How many hours of sleep per night? 8-9 hours  Do you have trouble falling asleep, staying asleep or waking up earlier than you need to? no  Do you have daytime fatigue? yes  Do you need medication for sleep? no  Do you use any supplements or other interventions for sleep? yes, 1/2 Unisom    Resilience  Rate your current level of stress- low/moderate  How do you manage stress?  Talking to friends, walking    Spirituality-  [employer]    Nutrition   Food allergies or sensitivities: no  Do you adhere to a particular type of diet? no  Do you have any concerns with your eating habits? no    Exercise  How would you describe your physical activity level? moderate  What do you do for physical activity? Fishing, walking    Past Medical " History  Past Medical History:   Diagnosis Date    Hyperlipemia       Past Surgical History   Past Surgical History:   Procedure Laterality Date    APPENDECTOMY        Family History   Family History   Problem Relation Name Age of Onset    Cancer Father          colon    Cancer Paternal Aunt          breast    Cancer Paternal Uncle          colon      Allergies  Review of patient's allergies indicates:  No Known Allergies   Current Medications:    Current Outpatient Medications:     calcitonin, salmon, (FORTICAL) 200 unit/actuation nasal spray, 1 SPRAY BY NASAL ROUTE ONCE DAILY. ALTERNATE NOSTRILS, Disp: 11.1 each, Rfl: 3    losartan (COZAAR) 100 MG tablet, Take 100 mg by mouth once daily., Disp: , Rfl:     magnesium oxide 400 mg Cap, Take 1 tablet by mouth once daily., Disp: , Rfl:     MIRALAX 17 gram/dose powder, Take by mouth., Disp: , Rfl:     pantoprazole (PROTONIX) 40 MG tablet, Take 40 mg by mouth once daily., Disp: , Rfl:     rosuvastatin (CRESTOR) 40 MG Tab, Take 10 mg by mouth every evening., Disp: , Rfl:     CMPD testosterone proprionate 2% in vanicream, , Disp: , Rfl:     COVID-19 vac, andra,Pfizer,,PF, (PFIZER COVID-19) 30 mcg/0.3 mL injection, Pfizer[facility] [employer] COVID-19 Vaccine (PF) 30 mcg/0.3 mL IM susp (purple)  Inject by intramuscular route. (Patient not taking: Reported on 5/20/2024), Disp: , Rfl:     cyanocobalamin 2000 MCG tablet, Take 2,000 mcg by mouth once daily. (Patient not taking: Reported on 5/20/2024), Disp: , Rfl:     scopolamine (TRANSDERM-SCOP) 1.3-1.5 mg (1 mg over 3 days), PLACE 1 PATCH ONTO THE SKIN EVERY THIRD DAY. (Patient not taking: Reported on 5/20/2024), Disp: , Rfl:     testosterone (ANDROGEL) 1 % (50 mg/5 gram) GlPk, Apply topically once daily. Compound-Patient did not bring meds with her wasn't sure of strength. (Patient not taking: Reported on 5/20/2024), Disp: , Rfl:     valACYclovir (VALTREX) 1000 MG tablet, Take 1,000 mg by mouth 3 (three) times daily. (Patient not taking:  "Reported on 5/20/2024), Disp: , Rfl:   No current facility-administered medications for this visit.    Facility-Administered Medications Ordered in Other Visits:     LIDOcaine-EPINEPHrine (PF) 1%-1:200,000 injection 5 mL, 5 mL, Subcutaneous, Once, Bhumika Hernandez MD     Review of Systems  Review of Systems   Constitutional: Negative.    HENT: Negative.     Eyes: Negative.    Respiratory: Negative.     Cardiovascular: Negative.    Gastrointestinal: Negative.    Genitourinary: Negative.    Musculoskeletal: Negative.    Skin: Negative.    Neurological: Negative.    Endo/Heme/Allergies: Negative.    Psychiatric/Behavioral: Negative.        Physical Exam      Vitals:    05/24/24 0935   BP: 128/78   BP Location: Right arm   Patient Position: Sitting   BP Method: Medium (Manual)   Pulse: 69   SpO2: 100%   Weight: 59.9 kg (132 lb 1.6 oz)   Height: 5' 4" (1.626 m)     Body mass index is 22.67 kg/m².  Physical Exam  Vitals reviewed.   Constitutional:       Appearance: Normal appearance.   Neurological:      Mental Status: She is alert.   Psychiatric:         Mood and Affect: Mood normal.         Behavior: Behavior normal.     ASSESSMENT :  1. Invasive ductal carcinoma of breast, female, right    2. Ductal carcinoma in situ (DCIS) of right breast       PLAN:  Reviewed all information discussed at today's visit and all questions were answered.    Counseled on healthy lifestyle and behavior modifications    PT referral placed.  I discussed the importance of therapy and explained the physical therapists will work with you to develop a specialized treatment program to help reduce and improve cancer-related problems and improve your strength and function. Some cancer treatments can result in lymphedema or other types of swelling. Your physical therapist can use methods to reduce, control, and prevent lymphedema and swelling.  I discussed and recommended the following support services:  Kin Chi and Yoga I suggested Kin Chi and/or " Yoga as these practices reduce stress, increases flexibility and muscle strength, improves balance and promotes serenity in the power of movement to help fight disease and boost your immune system.   Music and relaxation therapy and Meditation which can decrease stress by lowering blood pressure, slowing breathing, and helping you be more present in the moment. It improves sleep by relaxing the body and mind at the end of the day.Meditation also trains you how to focus on one thing at a time, improving concentration. It also promotes emotional well-being by decreasing depression and anxiety, and helping create a more positive outlook on life.    Follow up with [facility] in 4 months for possible survivorship visit.     I spent a total of 55 minutes on the day of the visit.This includes face to face time and non-face to face time preparing to see the patient (eg, review of tests), obtaining and/or reviewing separately obtained history, documenting clinical information in the electronic or other health record, independently interpreting results and communicating results to the patient/family/caregiver, or care coordinator.

## 2024-05-29 ENCOUNTER — TELEPHONE (OUTPATIENT)
Dept: HEMATOLOGY/ONCOLOGY | Facility: CLINIC | Age: 69
End: 2024-05-29
Payer: MEDICARE

## 2024-06-03 ENCOUNTER — TUMOR BOARD CONFERENCE (OUTPATIENT)
Dept: SURGERY | Facility: CLINIC | Age: 69
End: 2024-06-03
Payer: MEDICARE

## 2024-06-04 ENCOUNTER — PATIENT MESSAGE (OUTPATIENT)
Dept: HEMATOLOGY/ONCOLOGY | Facility: CLINIC | Age: 69
End: 2024-06-04
Payer: MEDICARE

## 2024-06-06 ENCOUNTER — TELEPHONE (OUTPATIENT)
Dept: HEMATOLOGY/ONCOLOGY | Facility: CLINIC | Age: 69
End: 2024-06-06
Payer: MEDICARE

## 2024-06-06 ENCOUNTER — PATIENT MESSAGE (OUTPATIENT)
Dept: HEMATOLOGY/ONCOLOGY | Facility: CLINIC | Age: 69
End: 2024-06-06
Payer: MEDICARE

## 2024-06-06 NOTE — TELEPHONE ENCOUNTER
Called pt to get her scheduled for [facility]  6/20/24; pt accepted the apt on 6/18/24 at 10:00 am

## 2024-06-07 ENCOUNTER — OFFICE VISIT (OUTPATIENT)
Dept: RADIATION ONCOLOGY | Facility: CLINIC | Age: 69
End: 2024-06-07
Payer: MEDICARE

## 2024-06-07 VITALS
WEIGHT: 133.19 LBS | BODY MASS INDEX: 22.86 KG/M2 | HEART RATE: 66 BPM | TEMPERATURE: 98 F | OXYGEN SATURATION: 99 % | DIASTOLIC BLOOD PRESSURE: 64 MMHG | SYSTOLIC BLOOD PRESSURE: 146 MMHG | RESPIRATION RATE: 16 BRPM

## 2024-06-07 DIAGNOSIS — C50.911 INVASIVE DUCTAL CARCINOMA OF BREAST, FEMALE, RIGHT: ICD-10-CM

## 2024-06-07 DIAGNOSIS — D05.11 DUCTAL CARCINOMA IN SITU (DCIS) OF RIGHT BREAST: ICD-10-CM

## 2024-06-07 PROCEDURE — 99999 PR PBB SHADOW E&M-EST. PATIENT-LVL III: CPT | Mod: PBBFAC,,, | Performed by: RADIOLOGY

## 2024-06-07 PROCEDURE — 99213 OFFICE O/P EST LOW 20 MIN: CPT | Mod: PBBFAC,PN | Performed by: RADIOLOGY

## 2024-06-07 PROCEDURE — 99205 OFFICE O/P NEW HI 60 MIN: CPT | Mod: S$PBB,,, | Performed by: RADIOLOGY

## 2024-06-07 NOTE — PROGRESS NOTES
06/07/2024    [facility]  [facility]   Radiation Oncology Consultation    ASSESSMENT  This is a 68 y.o. y/o female with Stage IA (cT1a, N0, M0, Grade 1, ER+/PA+/HER2-) Invasive ductal carcinoma of the right breast. She is seen for discussion of treatment options.       PLAN    Treatment options were discussed with the patient including mastectomy vs breast conservation with lumpectomy and adjuvant RT.  We discussed the goals of treatment to be curative.  The risks, benefits, scheduling, alternatives to and rationale of radiation therapy were explained in detail.   We discussed the indications, course, and potential risks associated with radiation therapy, including but not limited to fatigue, local skin reaction such as hyperpigmentation, tenderness or erythema, breast pain, and potential long term toxicities such as rib fragility, and remote risks of lung inflammation, esophageal inflammation, heart inflammation, or secondary malignancy.  She expressed understanding of these risks, all questions were answered to her apparent satisfaction.   After this discussion, she elected to proceed with lumpectomy 6/20/24.    Consent was obtained and all questions were answered to the best of my ability  A CT simulation will be performed on 7/16/24 to begin the planning process for the patient's radiation therapy.     She was given our contact information, and she was told that she could call our clinic at any time if she has any questions or concerns.      Radiation Treatment Details:   We plan to treat partial right breast to a dose of 30 Gy in 5 fractions at 6 Gy per fraction delivered QOD  We will utilize a(n) IMRT technique.   We will utilize daily CT or orthogonal image guidance due to the need for accurate daily patient alignment to treat the target volumes accurately and avoid radiation overdose to multiple regional organs at risk since we are treating the patient with complex target volumes  with multiple steep isodose gradients.       Chief Complaint   Patient presents with    Breast Cancer       HPI: The patient is a 68 y.o. year old female with a new diagnosis of breast cancer. She initially presented due to abnormal mammogram.     4/23/24 Screening mammogram:  Right Ca++ at upper outer position    5/1/24 Right Diagnostic mammogram/ultrasound:   9:00 anterior depth grouped pleomorphic Ca++ spanning 4mm  By ultrasound: 9:00 cyst 7cmfn 0.4cm; 9:00 2cmfn intraductal heterogeneous mass with assoc Ca++ 0.5cm  No abnormal lymph nodes     5/8/24 Right 9:00 2cmfn biopsy: in situ (G2) and invasive duct carcinoma (G1), +/+/-, Ki67 14%    5/15/24 MRI Breasts:   No suspicious lesions left breast  Right 10:00 anterior irregular enhancement lateral adjacent to bx clip, 0.5cm with vague enhancement inferior to clip 0.9cm  No abnormal nodes    Follow[facility] by Dr[facility] G[facility]a at [facility] for MGUS (asymptomatic)      Possibility of pregnancy: No  History of prior irradiation: No  History of prior systemic anti-cancer therapy: No  History of collagen vascular disease: No  Implanted electronic device (pacer/defib/nerve stimulator): No      Past Medical History:   Diagnosis Date    Hyperlipemia        Past Surgical History:   Procedure Laterality Date    APPENDECTOMY         Social History     Tobacco Use    Smoking status: Never    Smokeless tobacco: Never   Substance Use Topics    Alcohol use: Yes     Alcohol/week: 2.0 - 3.0 standard drinks of alcohol     Types: 2 - 3 Glasses of wine per week    Drug use: No       Cancer-related family history includes Cancer in her father, paternal aunt, and paternal uncle.    Current Outpatient Medications on File Prior to Visit   Medication Sig Dispense Refill    calcitonin, salmon, (FORTICAL) 200 unit/actuation nasal spray 1 SPRAY BY NASAL ROUTE ONCE DAILY. ALTERNATE NOSTRILS 11.1 each 3    losartan (COZAAR) 100 MG tablet Take 100 mg by mouth once daily.      magnesium oxide 400 mg Cap Take 1  tablet by mouth once daily.      MIRALAX 17 gram/dose powder Take by mouth.      pantoprazole (PROTONIX) 40 MG tablet Take 40 mg by mouth once daily.      rosuvastatin (CRESTOR) 40 MG Tab Take 10 mg by mouth every evening.       Current Facility-Administered Medications on File Prior to Visit   Medication Dose Route Frequency Provider Last Rate Last Admin    LIDOcaine-EPINEPHrine (PF) 1%-1:200,000 injection 5 mL  5 mL Subcutaneous Once Bhumika Hernandez MD           Review of patient's allergies indicates:  No Known Allergies    Review of Systems   Constitutional:  Positive for malaise/fatigue (mild fatigue since stopping all HRT). Negative for chills, diaphoresis, fever and weight loss.   Eyes:  Negative for double vision.   Respiratory:  Negative for cough and shortness of breath.    Cardiovascular:  Negative for chest pain.   Gastrointestinal:  Negative for constipation, diarrhea, nausea and vomiting.   Musculoskeletal:  Negative for back pain, joint pain and neck pain.   Neurological:  Negative for dizziness, sensory change, focal weakness and headaches.        Vital Signs: BP (!) 146/64   Pulse 66   Temp 98.4 °F (36.9 °C)   Resp 16   Wt 60.4 kg (133 lb 2.5 oz)   SpO2 99%   BMI 22.86 kg/m²     ECOG Performance Status: 0 - Fully Active    Physical Exam  Vitals reviewed. Exam conducted with a chaperone present.   Constitutional:       General: She is not in acute distress.     Appearance: Normal appearance. She is not ill-appearing or toxic-appearing.   HENT:      Head: Normocephalic and atraumatic.      Nose: Nose normal.   Eyes:      Extraocular Movements: Extraocular movements intact.      Conjunctiva/sclera: Conjunctivae normal.      Pupils: Pupils are equal, round, and reactive to light.   Pulmonary:      Effort: Pulmonary effort is normal.   Chest:   Breasts:     Breasts are symmetrical.      Right: Normal.      Left: Normal.       Musculoskeletal:         General: Normal range of motion.       Cervical back: Normal range of motion.   Lymphadenopathy:      Upper Body:      Right upper body: No supraclavicular, axillary or pectoral adenopathy.      Left upper body: No supraclavicular, axillary or pectoral adenopathy.   Skin:     General: Skin is warm.   Neurological:      General: No focal deficit present.      Mental Status: She is alert and oriented to person, place, and time.      Cranial Nerves: No cranial nerve deficit.      Gait: Gait normal.   Psychiatric:         Mood and Affect: Mood normal.         Behavior: Behavior normal.         Thought Content: Thought content normal.         Judgment: Judgment normal.            Imaging: I have personally reviewed the patient's available images and reports and summarized pertinent findings above in HPI.     Pathology: I have personally reviewed the patient's available pathology and summarized pertinent findings above in HPI.     This case was discussed with Dr. Eastman.      - Thank you for allowing me to participate in the care of your patient.    Sussy Thapa MD

## 2024-06-11 NOTE — PROGRESS NOTES
[facility]      BREAST MULTIDISCIPLINARY TUMOR BOARD    Nery Sandoval    Female    Date Presented to Tumor Board: 06/03/24    Presenting Hospital / Clinic: [facility]    Tumor Laterality: Right    Breast Tumor Site: UOQ    Presenter: Dr. Joy Eastman    Reason for Consultation: Initial Presentation    Specialties Present: Medical Oncology;Hematology;Radiation Oncology;Surgical Oncology;Pathology;Navigation;Integrative Oncology;Plastic Surgery    Patient Status: a current patient    Treatment to Date: None    Clinical Trial Eligibility: None available       Cancer Staging   Invasive ductal carcinoma of breast, female, right  Staging form: Breast, AJCC 8th Edition  - Clinical stage from 5/9/2024: Stage IA (cT1a, cN0, cM0, G1, ER+, SD+, HER2-) - Signed by Joy Eastman MD on 5/20/2024    Recommended Therapy:  Genetic testing - VUS  Surgery - Right Geovanna Lumpectomy  w/SLNB  Oncotype if >5mm  Radiation   Chemotherapy/Endocrine Therapy  Final recommendations pending surgical  path         Presentation at Cancer Conference: Prospective

## 2024-06-18 ENCOUNTER — CLINICAL SUPPORT (OUTPATIENT)
Dept: REHABILITATION | Facility: HOSPITAL | Age: 69
End: 2024-06-18
Payer: MEDICARE

## 2024-06-18 DIAGNOSIS — C50.911 INVASIVE DUCTAL CARCINOMA OF BREAST, FEMALE, RIGHT: ICD-10-CM

## 2024-06-18 DIAGNOSIS — Z91.89 AT RISK FOR LYMPHEDEMA: ICD-10-CM

## 2024-06-18 PROCEDURE — 97161 PT EVAL LOW COMPLEX 20 MIN: CPT | Mod: PN

## 2024-06-18 NOTE — PATIENT INSTRUCTIONS
Garment Recommendations:  Sigvaris Secure upper arm sleeve in size small, short length with 15-20 mmHg with silicone band at top. Sigvaris Secure gauntlet in size extra small with 15-20 mmHg. Please begin wearing these at 2 weeks post op for one year during the day pro-phylactically.   Good choice for bra wear is sports bra while you are undergoing cancer care.   Can be found online or al a local vendor that has in stock:   [employer]  [address], [place] [address]  [phone]         Reason to NOT do housework: seroma formation, therefore until fully healed need to not do repetitive activities.   Post Operative Breast Cancer Surgery Exercises     After surgery your shoulder and chest may feel tight and sore. Movement may cause stretching across your chest, axilla (armpit), and the incision site limiting your ability to raise your arm. Exercise will be extremely important in preventing swelling and in helping you regain movement, strength, and use of your arm.      Your post-operative exercise program can be divided into three stages. Your surgeon will inform you when to move to the next stage.      STAGE TIME PURPOSE EXERCISE   I Post-op day 0 to 4  (Drains are in) To prevent and/or reduce swelling - Positioning  - Hand and arm precautions   II Post-op day 5 to 14  (After drains are removed) To provide gentle movement without much stretching  - Shoulder shrugs  - Shoulder retraction   III Post-op day 15-6 wks  (After suture are removed) To stretch and regain full motion - Wall ladder  - Range of motion exercises  - Wand exercises         These exercises are general guideline for use following a mastectomy. Please consult your physician for additional information. Of a tissue expander has been placed, or if you have had reconstruction, you must get approval from your physician before beginning exercises.   Check with your physician prior to beginning a new  stage.  Avoid elbows above shoulders until after post-op day 14.     STAGE 1              Abdominal Breathing Exercises      Get comfortable and relax your neck and shoulders. You can sit or lie down. Place one hand on your upper chest and place the other hand on your belly button. Use your hands to feel the movements as you breathe in and out.  Take a deep breath in through your nose and feel the hand on your stomach move out. Do not let your shoulders move up. The hand on your chest should not move.   Breathe out slow and gentle through your mouth. Pucker your lips as if you were going to whistle or blow out a candle. The hand on your stomach should move in as you breathe out. Breathe out as long as you can until all the air is gone.   To help keep the lymphatic system moving well, practice two breaths every hour using the steps for abdominal breathing exercises.                Positioning    Several times a day, elevate your arm on pillows so your hand is above the level of your heart. This position will allow gravity to drain excess fluids out of your hand and arm. Try to place pillows under involved arm while in sitting position or while laying down.                                                                                                       STAGE 2               Shoulder Shrugs Shoulder Retraction    While sitting with arms supported, shrug both of your shoulder and then relax. Repeat 10 times, 3 times a day.   While sitting or standing, pull both shoulder back, bringing shoulder blades together. Repeat 10 times,   3 times a day.          STAGE 3  Range of Motion Exercises go to stretch not to pain     To retain full motion of your shoulder, it must be moved in all planes, several times a day. Begin arm range of motion exercises with 10 reps of each, 2 times a day. Progress to 3 x 10 reps, as tolerated 2 times a day.           Wand - Shoulder Flexion        Lay on your back in a comfortable position.  Raise both arms overhead, then bring back down by your side.                Wand - Shoulder Abduction        Lay on your back in a comfortable position. Raise both arms out to your side, then bring back down by your side.                   Wall Climbing - Shoulder Flexion       Stand facing the wall and extend the involved arm directly in front of you so that your fingertips touch the wall (at arm's length away from the wall). Creep up the side of the wall with your fingertips, taking a step toward the wall as you reach higher and higher up the wall. Repeat this procedure going down the wall, but taking a step backward this time. Begin with 5 wall climbs, then progress to 10 reps at a time, 1-2 times a day.                Wall Climbing - Shoulder Abduction     ([email or web address]/) Repeat the above procedure, but this time position yourself perpendicular (at a right angle) to the wall so that the involved arm will extend sideways up the wall. Keep your trunk straight, not leaning toward the wall or shrugging your shoulder. Place a patricia (tape) on the wall each week to see if you are making progress. Begin with 5 wall climbs, then progress to 10 reps at a time, 1-2 times a day.          PRECAUTIONS     As a result of the removal of lymph nodes and vessels, your arm is susceptible to infection and swelling. A hot, reddened or swollen arm denotes the presence of infection and should be brought to the attention of your physician immediately. Try to avoid cuts, scratches, pinpricks, hangnails, sunburns, insect bites, chemical irritation, and irritating detergent.     The following are helpful hints:     Avoid injections, blood draws, blood pressure, and IVs to be done to your involved arm. If you have undergone axillary dissection, then consider using the opposite only for injections, blood draws, blood pressure, and IVs.  Prevent chapping of your hand and arm by  applying lotion liberally several times a day. Recommend Eucerin lotion, No massages to affected upper extremity if you have had lymph nodes dissection or radiation to lymph nodes.   Do not cut nails too close to the quick. Do not cut or pick your cuticles. Use cuticle cream or remover.  Avoid carrying heavy objects (over 5 lbs) with your involved arm.   Protect your arm from burns with small electrical appliances such as irons, curling irons, and frying pans.   Wear sunscreen in the sun.  Apply insect repellent when going to areas where you might be exposed to insect bites.   Use an electric razor for shaving.   Wear loose fitting work/rubber gloves when washing dishes, using , or using steel wool.  Wear garden gloves when gardening or arranging thorn flowers.  Do not wear tight jewelry on your affected arm.  Do not wear narrow tight straps on clothing or under garments.     LIMIT repetitive activity, such as house work, typing etc to reduce chance to develop seroma.      IMPORTANT     If you do cut, burn, or delarosa the skin, wash the area and use an antiseptic. If it becomes red, warm, or unusually hard or swollen, contact your physician immediately.      If there is swelling without redness, increased warmth or hardness, the positioning and pumping exercises as described above can help decrease the swelling. Position your hand higher than the elbow, elbow higher than the shoulder, and shoulder higher than your heart. Maintain this position for 30 minutes. Repeat as necessary.

## 2024-06-18 NOTE — PROGRESS NOTES
[facility]  Physical Therapy Initial Evaluation PRE-OP  Lymphedema Therapy    Visit Date: 6/18/2024     Name: Nery Sandoval  Clinic Number: [id number]  Therapy Diagnosis:   Encounter Diagnoses   Name Primary?    Invasive ductal carcinoma of breast, female, right     At risk for lymphedema      Physician: Joy Eastman MD  Physician Orders: PT Eval and Treat  Medical Diagnosis from Referral:   Encounter Diagnoses   Name Primary?    Invasive ductal carcinoma of breast, female, right     At risk for lymphedema      Chart review pertaining to cancer hx:  Cancer Staging   Invasive ductal carcinoma of breast, female, right  Staging form: Breast, AJCC 8th Edition  - Clinical stage from 5/9/2024: Stage IA (cT1a, cN0, cM0, G1, ER+, SC+, HER2-) - Signed by Joy Eastman MD on 5/20/2024     Right 10:00 upper outer quadrant anterior depth 5 mm enhancement, inferior adjacent there is a 9 mm enhancement.  At the time of the biopsy the suspicious area seem larger.  Recommend wide excision     Grade 1 IDC ER 94 SC 93 HER2 1+ negative Ki 14     Per Dr Hernandez breast rad  At the right 10:00 anterior depth, there is irregular enhancement lateral adjacent to a biopsy clip measuring 5 x 3 x 2 mm with vague, subtle, below threshold enhancement inferior adjacent to the clip measuring 9 x 7 mm, which may be an underestimation of the extent of disease.  During the biopsy, the suspicious area seemed larger. Because the malignancy is low grade, it may not enhance. Recommend wide excision. Discussed this with the pt and .      Has all surgical options, lumpectomy, sentinel node, mastectomy/reconstruction  Genetics pending with Dr Powell ofc  Stop all HRT  Pt needs after June 11-12 6/20/24 main OR right reena lump, right SLNB. 2nd case if possible  Rad onc carrillo     Evaluation Date: 6/18/2024  Authorization: pending  Plan of Care Expiration: PT f/u 8 weeks post-op  Reassessment Due:  8 weeks       Visit: 1 / 3  PTA Visit: -- / 5  Time In: 10:05 AM  Time Out: 11:00 PM  Total Billable Time: 55 minutes    Precautions: Standard,  avoid BP, IV, blood draws and injections in R UE    Subjective     Pt reports: fatigue    Surgery date: 6/20/24 SLNB R and lumpectomy R  Radiation: plans on 5 tx   Chemotherapy: endocrine therapy    Pain  Location: tenderness at marker location in right breast   Current 0/10      Past Medical History:   Past Medical History:   Diagnosis Date    Hyperlipemia     Hypertension     Invasive ductal carcinoma of breast, female, right 06/2024    Monoclonal gammopathy     Osteopenia 06/2024       Past Surgical History:  has a past surgical history that includes Appendectomy.    Medications: has a current medication list which includes the following prescription(s): calcitonin (salmon), losartan, magnesium oxide, miralax, pantoprazole, and rosuvastatin, and the following Facility-Administered Medications: lidocaine-epinephrine (pf) 1%-1:200,000.    Allergies: Review of patient's allergies indicates:  No Known Allergies       Hand Dominance: Right  Diet: appetite is hungrier   Habitus: well developed, well nourished  BMI 22.83    Social History: lives with their spouse  Place of Residence (Steps/Adaptations): has 3 steps to enter  Occupation: Pt does not work.   Prior Exercise Routine: stays active but no formal program  Prior Level of Function: indep  Current Level of Function:  indep    Patient's Goals: learn what she needs to know to recover     Objective     Mental Status: Alert/Oriented    Observations  Posture: good awareness of posture  Joint Integrity: WFLs bilateral  Skin Integrity: intact bilateral  Edema: none bilateral    Sensation  Light Touch: intact bilateral  Proprioception: intact bilateral    A/PROM  (L) UE: WFLs  (R) UE: WFLs  Limitations:  wnl    STRENGTH  (L) UE: WFLs  (R) UE: WFLs  Limitations:  wnl    Baseline Measurements of BUEs  LANDMARK RIGHT UE (cm)   W +  16 inches 27.2   W + 14 inches 27.0   Elbow 22.5   W + 7  inches 22.4   W + 5  inches 18.9   Wrist 14.3   DPC 17.2   IP Thumb 6.2   Length 17.0   Garments recommended:   Sigvaris Secure upper arm sleeve in size small, short length with 15-20 mmHg with silicone band at top. Sigvaris Secure gauntlet in size extra small with 15-20 mmHg. Please begin wearing these at 2 weeks post op for one year during the day pro-phylactically.   Good choice for bra wear is sports bra while you are undergoing cancer care.     Functional Mobility   Bed mobility: independent   Roll to left: independent   Roll to right: independent   Supine to prone: independent   Scooting to edge of bed: independent   Supine to sit: independent   Sit to supine: independent   Transfers to bed: independent   Transfers to toilet: independent   Sit to stand: independent   Stand pivot: independent   Car transfers: independent     Gait Assessment  AD used: none  Assistance: independent  Distance: community distances  Endurance: WFL     Gait Pattern: wfl       Treatment     Treatment Time In: 10:05 AM  Treatment Time Out: 11:10 AM  Total Treatment time separate from Evaluation: 65 minutes      Self-Care/Home Management to improve behavioral/activity modifications related to ADLs, compensatory training, safety procedures, and adaptive equipment for 15 minutes including:  Garments: PT recommend wearing garments for one year per guidelines for prophylactical assist to decrease risk for lymphedema  Skin care  Weight management  Sleep  Nutrition  Infection prevention      Therapeutic Exercise to develop ROM, flexibility, and posture for 15 minutes including:  Surgical protocol for position recommendations  Diaphragmatic Breathing  Exercises by day, complete with pictures of exercises and description for safe progression of motion    Education: Instructed on general anatomy/physiology, lymphedema information (definitions, signs, symptoms, precautions), role of  therapy in multi-disciplinary team, purpose of lymphedema physical therapy and the benefits/risks of treatment, risks of refusing treatment, POC, and goals for therapy were discussed with the pt.    Written Home Exercises Provided: yes.  Exercises were reviewed and Nery was able to demonstrate them prior to the end of the session. Nery demonstrated good  understanding of the education provided.     See EMR under Patient Instructions for exercises provided 6/18/2024.    Assessment     Nery is a 68 y.o. female referred to outpatient physical therapy with a medical diagnosis of Invasive ductal carcinoma of breast, female, right and at risk for lymphedema with surgical procedure planned on 6/20/24. Pt was seen today pre-operatively to assess strength and ROM of BUEs, to take baseline circumferential measurements of BUEs to aid in the early detection of lymphedema post-operatively, and to provide pt education on exercises/precautions post-operative. Pt does not exhibit any ROM impairments currently. This pt will benefit from skilled PT for reassessment of baseline measurements 6-8 week post-operatively and to address future impairments following surgery such as pain, limited ROM, or decreased mobility. Will need to wait until pt is medically cleared to determine if pt is safe for MLD, pneumatic compression pump, or lymphatouch treatments.      Plan of care discussed with patient: Yes  Pt's spiritual, cultural and educational needs considered and patient is agreeable to the plan of care and goals as stated below:     Anticipated barriers for therapy:  none    Medical Necessity is demonstrated by the following:  History  Co-morbidities and personal factors that may impact the plan of care Co-morbidities:   history of cancer and osteopenia    Personal Factors:   none     low   Examination  Body Structures and Functions, activity limitations and participation restrictions that may impact the plan of care Body Systems:     gross symmetry    Activity limitations:   Mobility  no deficits    Self care  no deficits    Domestic Life  no deficits    Participation Restrictions:   none         low   Clinical Presentation evolving clinical presentation with changing clinical characteristics moderate   Decision Making/ Complexity Score: low       GOALS  Short Term Goals: 3 months  Pt to be seen for reassessment in 8 weeks after surgery.  Pt will demonstrate 100% knowledge of lymphedema precautions and signs of infection.  Pt to obtain compression garments for prophylactic concerns according to APTA clinical guidelines published in Journal of Physical Therapy.  4. Provide HEP and theraband for cancer survivorship protocol  Long Term Goals: deferred    Plan     Plan of Care: 6/18/2024 to 9/1/2024.    Patient to be seen in 8 weeks following surgical date for 2 visits for reassessment. Patient will benefit from Outpatient Physical Therapy services which may include the following interventions: patient education, HEP, therapeutic exercises, neuromuscular re-education, therapeutic activity, manual therapy, self care/home management, modalities, gait training, decongestive massage, multi-layered bandaging, self massage, self bandaging, and assistance in obtaining appropriate compression garment.      If pt is to undergo XRT, POC must exclude MLD, pneumatic compression pump, and lymphatouch to any radiated area.       Naida Espinoza, PT, CLT

## 2024-06-21 ENCOUNTER — TELEPHONE (OUTPATIENT)
Dept: REHABILITATION | Facility: HOSPITAL | Age: 69
End: 2024-06-21
Payer: MEDICARE

## 2024-06-21 ENCOUNTER — TELEPHONE (OUTPATIENT)
Dept: HEMATOLOGY/ONCOLOGY | Facility: CLINIC | Age: 69
End: 2024-06-21
Payer: MEDICARE

## 2024-06-21 NOTE — TELEPHONE ENCOUNTER
Returned call to pt in regards to her message that was left. Pt had questions for a PT in regards to her mobility issues after surgery. I informed pt that I would forward her call to the PT that was here today. Pt verbalized understanding and thanked me for returning her call.

## 2024-06-24 ENCOUNTER — TELEPHONE (OUTPATIENT)
Dept: HEMATOLOGY/ONCOLOGY | Facility: CLINIC | Age: 69
End: 2024-06-24
Payer: MEDICARE

## 2024-06-24 NOTE — TELEPHONE ENCOUNTER
Returned call to pt she stated that she already had an apt with Naida, but now she has some questions about her limitations after her surgery. I told her that Naida is with patients and that I would forward her message to Naida to give her a call after she finishes with her appointments for the day. Pt verbalized her understanding and will await the call from the therapist .  ----- Message from Pura Gracia, Patient Care Assistant sent at 6/24/2024  1:39 PM CDT -----  Type: Needs Medical Advice  Who Called:  radha Willson Call Back Number: [phone]    Additional Information: radha states she is needing to follow up appointment  with Naida , please call to further discuss, thank you.

## 2024-06-27 ENCOUNTER — DOCUMENTATION ONLY (OUTPATIENT)
Dept: SURGERY | Facility: CLINIC | Age: 69
End: 2024-06-27
Payer: MEDICARE

## 2024-06-27 NOTE — PROGRESS NOTES
Order for Oncotype faxed to [facility], along with pathology, face sheet with demographics and a copy of the insurance card.

## 2024-07-12 ENCOUNTER — TELEPHONE (OUTPATIENT)
Dept: HEMATOLOGY/ONCOLOGY | Facility: CLINIC | Age: 69
End: 2024-07-12
Payer: MEDICARE

## 2024-07-12 NOTE — NURSING
Oncotype resulted.  Spoke with pt.  Scheduled post op f/u with Dr. Carver for Tuesday at 11am.  Location and contact information reviewed.  Asked her to call with questions or concerns.  She thanked me and verbalized understanding.

## 2024-07-16 ENCOUNTER — HOSPITAL ENCOUNTER (OUTPATIENT)
Dept: RADIATION THERAPY | Facility: HOSPITAL | Age: 69
Discharge: HOME | End: 2024-07-16
Attending: INTERNAL MEDICINE
Payer: MEDICARE

## 2024-07-16 ENCOUNTER — OFFICE VISIT (OUTPATIENT)
Dept: HEMATOLOGY/ONCOLOGY | Facility: CLINIC | Age: 69
End: 2024-07-16
Payer: MEDICARE

## 2024-07-16 VITALS
BODY MASS INDEX: 22.96 KG/M2 | WEIGHT: 134.5 LBS | DIASTOLIC BLOOD PRESSURE: 71 MMHG | SYSTOLIC BLOOD PRESSURE: 120 MMHG | HEART RATE: 61 BPM | RESPIRATION RATE: 17 BRPM | HEIGHT: 64 IN | TEMPERATURE: 98 F

## 2024-07-16 DIAGNOSIS — M85.80 OSTEOPENIA, UNSPECIFIED LOCATION: ICD-10-CM

## 2024-07-16 DIAGNOSIS — D05.11 DUCTAL CARCINOMA IN SITU (DCIS) OF RIGHT BREAST: ICD-10-CM

## 2024-07-16 DIAGNOSIS — C50.911 INVASIVE DUCTAL CARCINOMA OF BREAST, FEMALE, RIGHT: Primary | ICD-10-CM

## 2024-07-16 PROCEDURE — 99999 PR PBB SHADOW E&M-EST. PATIENT-LVL III: CPT | Mod: PBBFAC,,, | Performed by: INTERNAL MEDICINE

## 2024-07-16 PROCEDURE — 77290 THER RAD SIMULAJ FIELD CPLX: CPT | Mod: 26,,, | Performed by: RADIOLOGY

## 2024-07-16 PROCEDURE — 77290 THER RAD SIMULAJ FIELD CPLX: CPT | Mod: TC,PN | Performed by: RADIOLOGY

## 2024-07-16 PROCEDURE — 77334 RADIATION TREATMENT AID(S): CPT | Mod: TC,PN | Performed by: RADIOLOGY

## 2024-07-16 PROCEDURE — 99215 OFFICE O/P EST HI 40 MIN: CPT | Mod: S$PBB,,, | Performed by: INTERNAL MEDICINE

## 2024-07-16 PROCEDURE — 99213 OFFICE O/P EST LOW 20 MIN: CPT | Mod: PBBFAC,PN | Performed by: INTERNAL MEDICINE

## 2024-07-16 PROCEDURE — 77334 RADIATION TREATMENT AID(S): CPT | Mod: 26,,, | Performed by: RADIOLOGY

## 2024-07-16 PROCEDURE — G2211 COMPLEX E/M VISIT ADD ON: HCPCS | Mod: S$PBB,,, | Performed by: INTERNAL MEDICINE

## 2024-07-16 PROCEDURE — 77014 HC CT GUIDANCE RADIATION THERAPY FLDS PLACEMENT: CPT | Mod: TC,PN | Performed by: RADIOLOGY

## 2024-07-16 PROCEDURE — 77263 THER RADIOLOGY TX PLNG CPLX: CPT | Mod: ,,, | Performed by: RADIOLOGY

## 2024-07-16 RX ORDER — FEZOLINETANT 45 MG/1
TABLET, FILM COATED ORAL
COMMUNITY
Start: 2024-07-01

## 2024-07-16 NOTE — PROGRESS NOTES
Name: Nery Sandoval  MRN:  [id number]  :  1955 Age 68 y.o.  Date of Service: 2024    Reason for visit:  Nery Sandoval is a 68 y.o. female here regarding...    #Right Invasive Ductal Carcinoma   Date of Original Diagnosis: 24  Original Stage: pT1pN0 Stage 1A ER 93% ID 93% HER2 0/negative; Ki-67 13%, low-grade, Oncotype 2   Current Sites of Disease:  Right breast  Current Goals of Therapy:  Curative  Current Therapy:  Pending RT and ET    Oncologic History/History of Present Illness:     Detected by screening mammogram. No symptoms.     2024 diagnostic mammogram with ultrasound: right 9:00, 2 cm from the nipple, there is an intraductal heterogeneous mass with associated calcifications measuring 5 x 3 x 4 mm    Menarche: age 16  First Child : nulliparous, n/'a  Use of Systemic Hormonal Therapy:  yes for years.   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 Never smoker     24- lumpectomy w/ SLNB, pT1pN0    Has met with RT, pending 5 fractions    Interval hx: here to discuss oncotype and endocrine therapy, healing well post operatively.       PHYSICAL EXAMINATION:  There were no vitals taken for this visit.  Wt Readings from Last 3 Encounters:   24 60.3 kg (133 lb)   24 60.3 kg (133 lb)   24 59 kg (130 lb)     ECOG PERFORMANCE STATUS: 0  Physical Exam   General:  Well-appearing, nontoxic  Eyes:  Equal and round pupils, EOMI, no scleral icterus  Mouth:  No lesions, moist  Cardiovascular:  Warm, well-perfused, no peripheral edema  Lungs:  Unlabored on room air, no wheezing  Neurologic:  Awake, alert and oriented, participating in the exam  Psych:  Appropriate mood and affect  Skin:  Normal pallor, No  rashes  Heme:  No petechiae, no purpura  Breasts:       LABORATORY:  CBC  Lab Results   Component Value Date    WBC 4.06 06/20/2024    HGB 14.0 06/20/2024    HCT 42.6 06/20/2024    MCV 98 06/20/2024     06/20/2024         BMP  Lab Results   Component Value Date     06/20/2024    K 4.0 06/20/2024     06/20/2024    CO2 26 06/20/2024    BUN 19 (H) 06/20/2024    CREATININE 0.60 06/20/2024    CALCIUM 10.2 06/20/2024    ANIONGAP 7 06/20/2024    ESTGFRAFRICA >60 10/31/2017    EGFRNONAA >60 10/31/2017         PATHOLOGY:        RADIOLOGY:  DEXA scan 04/23/2024  Osteopenia      ASSESSMENT AND PLAN:  Nery Sandoval is a 68 y.o. female with...    #Right Invasive Ductal Carcinoma   Date of Original Diagnosis: 05/08/24  Original Stage: pT1pN0 Stage 1A ER 93% CA 93% HER2 0/negative; Ki-67 13%, low-grade, Oncotype 2   Current Sites of Disease:  Right breast  Current Goals of Therapy:  Curative  Current Therapy:  Pending RT and ET    No chemo recommended based on oncotype     I counseled the patient regarding the stage of her disease in the role of adjuvant endocrine therapy therapy for hormone positive breast cancer.  I counseled the patient regarding the proposed endocrine therapy of anastrozole 1 mg once daily for a duration of 5 years to prevent breast cancer recurrence in her ipsilateral breast and protect the contralateral breast.  I counseled the patient regarding the typical side effects of anastrozole including joint stiffness, hot flashes, vaginal dryness, bone demineralization.  I counseled the patient regarding the possible side effects of Tamoxifen including hot flashes, thromboembolism, and uterine cancer. Counseled patient to continue routine annual GYN visits.     She wants to think about anastrozole vs. Tamoxifen and get back to me regarding her choice of endocrine therapy. She has stopped all systemic hormones.        #Bone Health  -patient will be on endocrine therapy x 5 years, baseline  DEXA completed April 2024 with osteopenia, plans to follow up with primary care regarding bone health therapy     #Lipids- at risk for hyperlipidemia while on endocrine therapy; advise to continue routine PCP visits with lipid checks, already on a statin  # hypertension-on losartan, controlled, per PCP      Zahra Carver M.D.  Hematology/Oncology     Route Chart for Scheduling    Med Onc Chart Routing      Follow up with physician 3 months.   Follow up with [facility] 6 weeks. cbc and cmp same day   Infusion scheduling note    Injection scheduling note    Labs    Imaging    Pharmacy appointment    Other referrals

## 2024-08-26 ENCOUNTER — TELEPHONE (OUTPATIENT)
Dept: HEMATOLOGY/ONCOLOGY | Facility: CLINIC | Age: 69
End: 2024-08-26
Payer: MEDICARE

## 2024-08-26 NOTE — TELEPHONE ENCOUNTER
----- Message from Milly Trinidad sent at 8/26/2024  9:54 AM CDT -----  Type: Needs Medical Advice  Who Called:  Patient   Symptoms (please be specific):    How long has patient had these symptoms:    Pharmacy name and phone #:    Best Call Back Number: [phone]  Additional Information: Patient is requesting a call back to reschedule her appt. on 8/27 with labs.

## 2024-08-29 ENCOUNTER — LAB VISIT (OUTPATIENT)
Dept: LAB | Facility: HOSPITAL | Age: 69
End: 2024-08-29
Attending: INTERNAL MEDICINE
Payer: MEDICARE

## 2024-08-29 ENCOUNTER — TELEPHONE (OUTPATIENT)
Dept: HEMATOLOGY/ONCOLOGY | Facility: CLINIC | Age: 69
End: 2024-08-29
Payer: MEDICARE

## 2024-08-29 ENCOUNTER — OFFICE VISIT (OUTPATIENT)
Dept: HEMATOLOGY/ONCOLOGY | Facility: CLINIC | Age: 69
End: 2024-08-29
Payer: MEDICARE

## 2024-08-29 VITALS
RESPIRATION RATE: 18 BRPM | DIASTOLIC BLOOD PRESSURE: 65 MMHG | BODY MASS INDEX: 23.11 KG/M2 | HEART RATE: 77 BPM | TEMPERATURE: 98 F | SYSTOLIC BLOOD PRESSURE: 110 MMHG | HEIGHT: 64 IN | OXYGEN SATURATION: 98 % | WEIGHT: 135.38 LBS

## 2024-08-29 DIAGNOSIS — E83.52 HYPERCALCEMIA: ICD-10-CM

## 2024-08-29 DIAGNOSIS — M85.852 OSTEOPENIA OF NECK OF LEFT FEMUR: ICD-10-CM

## 2024-08-29 DIAGNOSIS — D05.11 DUCTAL CARCINOMA IN SITU (DCIS) OF RIGHT BREAST: ICD-10-CM

## 2024-08-29 DIAGNOSIS — C50.911 INVASIVE DUCTAL CARCINOMA OF BREAST, FEMALE, RIGHT: ICD-10-CM

## 2024-08-29 DIAGNOSIS — M85.80 OSTEOPENIA, UNSPECIFIED LOCATION: ICD-10-CM

## 2024-08-29 DIAGNOSIS — C50.911 INVASIVE DUCTAL CARCINOMA OF BREAST, FEMALE, RIGHT: Primary | ICD-10-CM

## 2024-08-29 LAB
ALBUMIN SERPL BCP-MCNC: 4.1 G/DL (ref 3.5–5.2)
ALP SERPL-CCNC: 67 U/L (ref 55–135)
ALT SERPL W/O P-5'-P-CCNC: 23 U/L (ref 10–44)
ANION GAP SERPL CALC-SCNC: 11 MMOL/L (ref 8–16)
AST SERPL-CCNC: 17 U/L (ref 10–40)
BASOPHILS # BLD AUTO: 0.04 K/UL (ref 0–0.2)
BASOPHILS NFR BLD: 0.8 % (ref 0–1.9)
BILIRUB SERPL-MCNC: 0.6 MG/DL (ref 0.1–1)
BUN SERPL-MCNC: 18 MG/DL (ref 8–23)
CALCIUM SERPL-MCNC: 11.2 MG/DL (ref 8.7–10.5)
CHLORIDE SERPL-SCNC: 107 MMOL/L (ref 95–110)
CO2 SERPL-SCNC: 23 MMOL/L (ref 23–29)
CREAT SERPL-MCNC: 0.9 MG/DL (ref 0.5–1.4)
DIFFERENTIAL METHOD BLD: ABNORMAL
EOSINOPHIL # BLD AUTO: 0.2 K/UL (ref 0–0.5)
EOSINOPHIL NFR BLD: 3.6 % (ref 0–8)
ERYTHROCYTE [DISTWIDTH] IN BLOOD BY AUTOMATED COUNT: 13.1 % (ref 11.5–14.5)
EST. GFR  (NO RACE VARIABLE): >60 ML/MIN/1.73 M^2
GLUCOSE SERPL-MCNC: 117 MG/DL (ref 70–110)
HCT VFR BLD AUTO: 39.8 % (ref 37–48.5)
HGB BLD-MCNC: 13.4 G/DL (ref 12–16)
IMM GRANULOCYTES # BLD AUTO: 0.01 K/UL (ref 0–0.04)
IMM GRANULOCYTES NFR BLD AUTO: 0.2 % (ref 0–0.5)
LYMPHOCYTES # BLD AUTO: 1 K/UL (ref 1–4.8)
LYMPHOCYTES NFR BLD: 20.6 % (ref 18–48)
MCH RBC QN AUTO: 33.8 PG (ref 27–31)
MCHC RBC AUTO-ENTMCNC: 33.7 G/DL (ref 32–36)
MCV RBC AUTO: 101 FL (ref 82–98)
MONOCYTES # BLD AUTO: 0.5 K/UL (ref 0.3–1)
MONOCYTES NFR BLD: 9.7 % (ref 4–15)
NEUTROPHILS # BLD AUTO: 3.2 K/UL (ref 1.8–7.7)
NEUTROPHILS NFR BLD: 65.1 % (ref 38–73)
NRBC BLD-RTO: 0 /100 WBC
PLATELET # BLD AUTO: 145 K/UL (ref 150–450)
PMV BLD AUTO: 9.3 FL (ref 9.2–12.9)
POTASSIUM SERPL-SCNC: 4.1 MMOL/L (ref 3.5–5.1)
PROT SERPL-MCNC: 7.4 G/DL (ref 6–8.4)
PTH-INTACT SERPL-MCNC: 44 PG/ML (ref 9–77)
RBC # BLD AUTO: 3.96 M/UL (ref 4–5.4)
SODIUM SERPL-SCNC: 141 MMOL/L (ref 136–145)
WBC # BLD AUTO: 4.95 K/UL (ref 3.9–12.7)

## 2024-08-29 PROCEDURE — 85025 COMPLETE CBC W/AUTO DIFF WBC: CPT | Mod: PN | Performed by: INTERNAL MEDICINE

## 2024-08-29 PROCEDURE — 99999 PR PBB SHADOW E&M-EST. PATIENT-LVL III: CPT | Mod: PBBFAC,,, | Performed by: NURSE PRACTITIONER

## 2024-08-29 PROCEDURE — 36415 COLL VENOUS BLD VENIPUNCTURE: CPT | Mod: PN | Performed by: NURSE PRACTITIONER

## 2024-08-29 PROCEDURE — 83970 ASSAY OF PARATHORMONE: CPT | Performed by: NURSE PRACTITIONER

## 2024-08-29 PROCEDURE — 36415 COLL VENOUS BLD VENIPUNCTURE: CPT | Mod: PN | Performed by: INTERNAL MEDICINE

## 2024-08-29 PROCEDURE — 80053 COMPREHEN METABOLIC PANEL: CPT | Mod: PN | Performed by: INTERNAL MEDICINE

## 2024-08-29 PROCEDURE — 99213 OFFICE O/P EST LOW 20 MIN: CPT | Mod: PBBFAC,PN | Performed by: NURSE PRACTITIONER

## 2024-08-29 RX ORDER — ANASTROZOLE 1 MG/1
1 TABLET ORAL DAILY
Qty: 90 TABLET | Refills: 3 | Status: SHIPPED | OUTPATIENT
Start: 2024-08-29 | End: 2025-08-29

## 2024-08-29 RX ORDER — BUPROPION HYDROCHLORIDE 150 MG/1
TABLET ORAL
COMMUNITY
Start: 2024-08-22

## 2024-08-29 RX ORDER — TAMOXIFEN CITRATE 20 MG/1
20 TABLET ORAL DAILY
Qty: 90 TABLET | Refills: 3 | Status: SHIPPED | OUTPATIENT
Start: 2024-08-29 | End: 2024-08-29 | Stop reason: ALTCHOICE

## 2024-08-29 NOTE — PROGRESS NOTES
Name: Nery Sandoval  MRN:  [id number]  :  1955 Age 68 y.o.  Date of Service: 2024    Reason for visit:  Nery Sandoval is a 68 y.o. female here regarding.....start of Anastrozole vs Tamoxifen    #Right Invasive Ductal Carcinoma   Date of Original Diagnosis: 24  Original Stage: pT1pN0 Stage 1A ER 93% IA 93% HER2 0/negative; Ki-67 13%, low-grade, Oncotype 2   Current Sites of Disease:  Right breast  Current Goals of Therapy:  Curative  Current Therapy:  Pending RT and ET    Oncologic History/History of Present Illness:     Detected by screening mammogram. No symptoms.     2024 diagnostic mammogram with ultrasound: right 9:00, 2 cm from the nipple, there is an intraductal heterogeneous mass with associated calcifications measuring 5 x 3 x 4 mm    Menarche: age 16  First Child : nulliparous, n/'a  Use of Systemic Hormonal Therapy:  yes for years.   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 Never smoker     24- lumpectomy w/ SLNB, pT1pN0    Has met with RT, pending 5 fractions    Interval hx: 24  Presents to the clinic for start of ET; has decided after review of literature & handouts from Dr. Carver that she would like Tamoxifen.  Reviewed BMD & will speak with GYN in regards to treatment for Osteopenia.  Patient reports elevated Protein in urine & seeing Dr. Gasca & Dr. Kebede; prefers for Dr. Carver to follow as MEDONC for both breast & urine protein.  Labs reviewed with noted elevated Calcium 11.2; discussed with Dr. Etienne; patient asymptomatic; will draw PTH, PTHrp today.  Call from pharmacy that   Spoke with pharmD. Wellbutrin interacts with tamoxifen, decreasing it's efficacy. pharmD wants to know if OK to  "dispense tamoxifen to patient.      Tamoxifen d/c'd; Anastrozole ordered; patient informed.      PHYSICAL EXAMINATION:  /65 (BP Location: Right arm, Patient Position: Sitting, BP Method: Medium (Automatic))   Pulse 77   Temp 97.6 °F (36.4 °C) (Temporal)   Resp 18   Ht 5' 4" (1.626 m)   Wt 61.4 kg (135 lb 5.8 oz)   SpO2 98%   BMI 23.23 kg/m²   Wt Readings from Last 3 Encounters:   08/29/24 61.4 kg (135 lb 5.8 oz)   07/16/24 61 kg (134 lb 7.7 oz)   06/28/24 60.3 kg (133 lb)     ECOG PERFORMANCE STATUS: 0  Physical Exam   General:  Well-appearing, nontoxic  Eyes:  Equal and round pupils, EOMI, no scleral icterus  Mouth:  No lesions, moist  Cardiovascular:  Warm, well-perfused, no peripheral edema  Lungs:  Unlabored on room air, no wheezing  Neurologic:  Awake, alert and oriented, participating in the exam  Psych:  Appropriate mood and affect  Skin:  Normal pallor, No rashes  Heme:  No petechiae, no purpura         LABORATORY:  CBC  Lab Results   Component Value Date    WBC 4.95 08/29/2024    HGB 13.4 08/29/2024    HCT 39.8 08/29/2024     (H) 08/29/2024     (L) 08/29/2024     BMP  Lab Results   Component Value Date     08/29/2024    K 4.1 08/29/2024     08/29/2024    CO2 23 08/29/2024    BUN 18 08/29/2024    CREATININE 0.9 08/29/2024    CALCIUM 11.2 (H) 08/29/2024    ANIONGAP 11 08/29/2024    ESTGFRAFRICA >60 10/31/2017    EGFRNONAA >60 10/31/2017         PATHOLOGY:        RADIOLOGY:  DEXA scan 04/23/2024  Osteopenia      ASSESSMENT AND PLAN:  Nery Sandoval is a 68 y.o. female with...    #Right Invasive Ductal Carcinoma   Date of Original Diagnosis: 05/08/24  Original Stage: pT1pN0 Stage 1A ER 93% PA 93% HER2 0/negative; Ki-67 13%, low-grade, Oncotype 2   Current Sites of Disease:  Right breast  Current Goals of Therapy:  Curative  Current Therapy:  Start of Anastrozole today 08/29/24    No chemo recommended based on oncotype     I counseled the patient regarding the stage " of her disease in the role of adjuvant endocrine therapy therapy for hormone positive breast cancer.  I counseled the patient regarding the proposed endocrine therapy of anastrozole 1 mg once daily for a duration of 5 years to prevent breast cancer recurrence in her ipsilateral breast and protect the contralateral breast.  I counseled the patient regarding the typical side effects of anastrozole including joint stiffness, hot flashes, vaginal dryness, bone demineralization.  I counseled the patient regarding the possible side effects of Tamoxifen including hot flashes, thromboembolism, and uterine cancer. Counseled patient to continue routine annual GYN visits.     She wants to think about anastrozole vs. Tamoxifen and get back to me regarding her choice of endocrine therapy. She has stopped all systemic hormones.      08/29/24:  Anastrozole 1 mg p.o. daily x 5 years initiated today; f/u as scheduled with Dr. Carver on 10/14/24; call for any concerns prior.    #Bone Health  -patient will be on endocrine therapy x 5 years, baseline DEXA completed April 2024 with osteopenia, plans to follow up with primary care regarding bone health therapy   08/29/24:  Will speak with Dr. Tomas/KARL Vail NP in regards to tx for Osteopenia.    #Lipids- at risk for hyperlipidemia while on endocrine therapy; advise to continue routine PCP visits with lipid checks, already on a statin  # hypertension-on losartan, controlled, per PCP    #Macrocytosis/Urine Protein:  followed by Dr. KARISHMA Kebede & to be followed by Dr. Carver    #Thrombocytopenia - stable; continue to follow      KRISHAN Carter, MYRIAM-MARIAH  [facility]  40 minutes were spent in coordination of patient's care, record review and counseling.    Route Chart for Scheduling    Med Onc Chart Routing      Follow up with physician . F/u as scheduled with Dr. Carver on 10/14/24   Follow up with [facility]    Infusion scheduling note    Injection  scheduling note    Labs    Imaging    Pharmacy appointment    Other referrals

## 2024-08-29 NOTE — TELEPHONE ENCOUNTER
Spoke with pharmD. Wellbutrin interacts with tamoxifen, decreasing it's efficacy. pharmD wants to know if OK to dispense tamoxifen to patient.      pharmD [phone]

## 2024-09-04 LAB — PTH RELATED PROT SERPL-SCNC: <0.4 PMOL/L

## 2024-09-30 ENCOUNTER — PATIENT MESSAGE (OUTPATIENT)
Dept: HEMATOLOGY/ONCOLOGY | Facility: CLINIC | Age: 69
End: 2024-09-30
Payer: MEDICARE

## 2024-10-11 ENCOUNTER — TELEPHONE (OUTPATIENT)
Dept: HEMATOLOGY/ONCOLOGY | Facility: CLINIC | Age: 69
End: 2024-10-11
Payer: MEDICARE

## 2024-10-11 NOTE — TELEPHONE ENCOUNTER
Spoke with the pt and got the pt rescheduled on 11/27. Pt verbalized and understanding.  ----- Message from Alberta sent at 10/11/2024  1:26 PM CDT -----  Type:  Patient Returning Call    Who Called:  Patient   Who Left Message for Patient:  Rolanda  Does the patient know what this is regarding?:  yes  Best Call Back Number:  [phone]  Additional Information:  Patient returned call and stated she needs to reschedule her appt. On 10/14 to after 10 21 at anytime. Please call and leave message if she doesn't answer.

## 2024-10-11 NOTE — TELEPHONE ENCOUNTER
Left message to call the office to schedule/reschedule appt. Recall number provided.  ----- Message from Alberta sent at 10/11/2024 10:01 AM CDT -----  Type: Needs Medical Advice  Who Called:  Patient   Symptoms (please be specific):    How long has patient had these symptoms:    Pharmacy name and phone #:    Best Call Back Number: [phone]  Additional Information: Patient is requesting a call back to  reschedule her appt. on 10/14.

## 2024-10-30 PROBLEM — M81.0 SENILE OSTEOPOROSIS: Status: ACTIVE | Noted: 2024-10-30

## 2024-11-27 ENCOUNTER — PATIENT MESSAGE (OUTPATIENT)
Dept: RADIATION ONCOLOGY | Facility: CLINIC | Age: 69
End: 2024-11-27
Payer: MEDICARE

## 2024-11-27 ENCOUNTER — OFFICE VISIT (OUTPATIENT)
Dept: HEMATOLOGY/ONCOLOGY | Facility: CLINIC | Age: 69
End: 2024-11-27
Payer: MEDICARE

## 2024-11-27 VITALS
HEART RATE: 84 BPM | DIASTOLIC BLOOD PRESSURE: 68 MMHG | BODY MASS INDEX: 23.11 KG/M2 | WEIGHT: 135.38 LBS | HEIGHT: 64 IN | SYSTOLIC BLOOD PRESSURE: 112 MMHG | OXYGEN SATURATION: 95 % | RESPIRATION RATE: 14 BRPM | TEMPERATURE: 98 F

## 2024-11-27 DIAGNOSIS — C50.911 INVASIVE DUCTAL CARCINOMA OF BREAST, FEMALE, RIGHT: Primary | ICD-10-CM

## 2024-11-27 DIAGNOSIS — D69.6 THROMBOCYTOPENIA: ICD-10-CM

## 2024-11-27 DIAGNOSIS — Z12.31 ENCOUNTER FOR SCREENING MAMMOGRAM FOR MALIGNANT NEOPLASM OF BREAST: ICD-10-CM

## 2024-11-27 DIAGNOSIS — R80.9 PROTEINURIA, UNSPECIFIED TYPE: ICD-10-CM

## 2024-11-27 PROCEDURE — 99999 PR PBB SHADOW E&M-EST. PATIENT-LVL IV: CPT | Mod: PBBFAC,,, | Performed by: INTERNAL MEDICINE

## 2024-11-27 PROCEDURE — 99214 OFFICE O/P EST MOD 30 MIN: CPT | Mod: PBBFAC,PN | Performed by: INTERNAL MEDICINE

## 2024-11-27 NOTE — PROGRESS NOTES
"Name: Nery Sandoval  MRN:  [id number]  :  1955 Age 69 y.o.  Date of Service: 2024    Reason for visit:  Nery Sandoval is a 69 y.o. female here regarding..    #Right Invasive Ductal Carcinoma   Date of Original Diagnosis: 24  Original Stage: pT1pN0 Stage 1A ER 93% ID 93% HER2 0/negative; Ki-67 13%, low-grade, Oncotype 2   Current Sites of Disease:  None  Current Goals of Therapy:  Curative  Current Therapy: Anastrozole (start: 24)    Oncologic History/History of Present Illness:     Detected by screening mammogram. No symptoms.   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 Never smoker     24- lumpectomy w/ SLNB, pT1pN0  24- completed 5 FX RT    Interval hx: (2024)  -stopped taking anastrozole and wellbutrin about 3 weeks ago due to fatigue and overall feeling unwell after starting them  -she has no breast concerns but is hoping to be able to get ultrasounds with her mammograms     PHYSICAL EXAMINATION:  /68 (BP Location: Right arm, Patient Position: Sitting)   Pulse 84   Temp 97.9 °F (36.6 °C) (Temporal)   Resp 14   Ht 5' 4" (1.626 m)   Wt 61.4 kg (135 lb 5.8 oz)   SpO2 95%   BMI 23.23 kg/m²   Wt Readings from Last 3 Encounters:   24 61.4 kg (135 lb 5.8 oz)   10/21/24 61.2 kg (135 lb)   24 61.4 kg (135 lb 5.8 oz)     ECOG PERFORMANCE STATUS: 0  Physical Exam   General:  Well-appearing, nontoxic  Eyes:  Equal and round pupils, EOMI, no scleral icterus  Mouth:  No lesions, moist  Cardiovascular:  Warm, well-perfused, no peripheral edema  Lungs:  Unlabored on room air, no wheezing  Neurologic:  Awake, alert and oriented, participating in the exam  Psych:  Appropriate mood and affect  Skin:  Normal " pallor, No rashes  Heme:  No petechiae, no purpura  Breasts: left breast normal without mass, skin or nipple changes or axillary nodes, surgical scars noted right breast with hyperpigmentation and seborrheic keratosis.       LABORATORY:  CBC  Lab Results   Component Value Date    WBC 3.73 (L) 10/17/2024    WBC 3.90 10/17/2024    HGB 13.3 10/17/2024    HGB 13.4 10/17/2024    HCT 41.5 10/17/2024    HCT 41.4 10/17/2024     (H) 10/17/2024     (H) 10/17/2024     (L) 10/17/2024     (L) 10/17/2024     BMP  Lab Results   Component Value Date     10/17/2024     10/17/2024    K 4.3 10/17/2024    K 4.3 10/17/2024     10/17/2024     10/17/2024    CO2 20 (L) 10/17/2024    CO2 21 (L) 10/17/2024    BUN 17 10/17/2024    BUN 17 10/17/2024    CREATININE 0.72 10/17/2024    CREATININE 0.70 10/17/2024    CALCIUM 10.2 10/17/2024    CALCIUM 10.2 10/17/2024    ANIONGAP 13 (H) 10/17/2024    ANIONGAP 13 (H) 10/17/2024    ESTGFRAFRICA >60 10/31/2017    EGFRNONAA >60 10/31/2017         PATHOLOGY:        RADIOLOGY:  DEXA scan 04/23/2024  Osteopenia      ASSESSMENT AND PLAN:  Nery Sandoval is a 69 y.o. female with...    #Right Invasive Ductal Carcinoma   Date of Original Diagnosis: 05/08/24  Original Stage: pT1pN0 Stage 1A ER 93% ND 93% HER2 0/negative; Ki-67 13%, low-grade, Oncotype 2   Current Sites of Disease:  None  Current Goals of Therapy:  Curative  Current Therapy: Anastrozole (start: 08/29/24, self discontinued 11/6/24 )    No chemo recommended based on oncotype     I counseled the patient regarding the stage of her disease in the role of adjuvant endocrine therapy therapy for hormone positive breast cancer.  I counseled the patient regarding the proposed endocrine therapy of anastrozole 1 mg once daily for a duration of 5 years to prevent breast cancer recurrence in her ipsilateral breast and protect the contralateral breast.  I counseled the patient regarding the typical side  effects of anastrozole including joint stiffness, hot flashes, vaginal dryness, bone demineralization.  I counseled the patient regarding the possible side effects of Tamoxifen including hot flashes, thromboembolism, and uterine cancer. Counseled patient to continue routine annual GYN visits.     S/p lumpectomy, needs annual mammograms in April.     08/29/24:  Anastrozole 1 mg p.o. daily x 5 years initiated due to interaction with Wellbutrin for tamoxifen  11/27/24:  wants to hold anastrozole through the holidays, also stopped wellbutrin, plans to do a trial of anastrozole again to see if she can tolerate it, if not plans to ask us for tamoxifen script; ordered mammogram for April 2025, patient requested bilateral US with mammograms due to self reported dense breast tissue.     #Bone Health  -patient will be on endocrine therapy x 5 years, baseline DEXA completed April 2024 with osteopenia, plans to follow up with primary care regarding bone health therapy   08/29/24:  Will speak with Dr. Tomas/KARL Vail NP in regards to tx for Osteopenia.    #Lipids- at risk for hyperlipidemia while on endocrine therapy; advise to continue routine PCP visits with lipid checks, already on a statin  # hypertension-on losartan, controlled, per PCP    #Macrocytosis/Urine Protein: asking me and Dr. MARIAH kebede to follow this, last seen by Dr. Kebede Oct 2024  #MGUS?- repeating myeloma labs at next lab draw  #Thrombocytopenia - has had platelet clumping on prior draws,  will need to draw next cbc in a citrated tube     Zahra Carver M.D.   Oncology and Benign Hematology      Route Chart for Scheduling    Med Onc Chart Routing      Follow up with physician 4 months. 5 months with mammogram and ultrasoun results (April 2025) in a gown please, get ordered labs 2-3 days prior   Follow up with [facility] . 1 year in a gown   Infusion scheduling note    Injection scheduling note    Labs    Imaging Mammogram   breast US in [place]   Pharmacy  appointment    Other referrals                    Therapy Plan Information  DENOSUMAB (PROLIA) Q6M for Senile osteoporosis, noted on 10/30/2024  denosumab (PROLIA) injection 60 mg  60 mg, Subcutaneous, Every 26 weeks      No therapy plan of the specified type found.    No therapy plan of the specified type found.

## 2025-01-30 ENCOUNTER — OFFICE VISIT (OUTPATIENT)
Dept: RADIATION ONCOLOGY | Facility: CLINIC | Age: 70
End: 2025-01-30
Payer: MEDICARE

## 2025-01-30 VITALS
BODY MASS INDEX: 22.7 KG/M2 | RESPIRATION RATE: 16 BRPM | WEIGHT: 132.94 LBS | DIASTOLIC BLOOD PRESSURE: 65 MMHG | HEART RATE: 78 BPM | HEIGHT: 64 IN | TEMPERATURE: 98 F | OXYGEN SATURATION: 99 % | SYSTOLIC BLOOD PRESSURE: 145 MMHG

## 2025-01-30 DIAGNOSIS — C50.911 INVASIVE DUCTAL CARCINOMA OF BREAST, FEMALE, RIGHT: Primary | ICD-10-CM

## 2025-01-30 PROCEDURE — 99999 PR PBB SHADOW E&M-EST. PATIENT-LVL IV: CPT | Mod: PBBFAC,,, | Performed by: RADIOLOGY

## 2025-01-30 PROCEDURE — 99214 OFFICE O/P EST MOD 30 MIN: CPT | Mod: PBBFAC,PN | Performed by: RADIOLOGY

## 2025-01-30 NOTE — PROGRESS NOTES
01/30/2025    [facility]  Department of Radiation Oncology  [facility]  Follow Up Visit Note    Diagnosis:  Nery Sandoval is a 69 y.o. female with a(n)Stage IA (pT1b, N0, M0, Grade 1, ER+/AL+/HER2-) Invasive ductal carcinoma of the right breast.     Oncologic History:  Oncology History   Invasive ductal carcinoma of breast, female, right   5/9/2024 Cancer Staged    Staging form: Breast, AJCC 8th Edition  - Clinical stage from 5/9/2024: Stage IA (cT1a, cN0, cM0, G1, ER+, AL+, HER2-)     5/16/2024 Initial Diagnosis    Invasive ductal carcinoma of breast, female, right     6/26/2024 Cancer Staged    Staging form: Breast, AJCC 8th Edition  - Pathologic stage from 6/26/2024: Stage IA (pT1b, pN0(sn), cM0, G1, ER+, AL+, HER2-, Oncotype DX score: 2)     7/24/2024 - 7/31/2024 Radiation Therapy    Treating physician: Sussy Thapa  Total Dose: 26 Gy  Fractions: 5  Treatment Site Ref. ID Energy Dose/Fx (Gy) #Fx Dose Correction (Gy) Total Dose (Gy) Start Date End Date Elapsed Days   3D Breast_R NormEZCalc 6X 5.2 5 / 5 0 26 7/24/2024 7/31/2024 7             Interval History  The patient presents today for a regularly scheduled follow up visit.  She was last seen in our clinic on 7/31/24 at completion of therapy.   Since that time, stopped taking (also had to stop HRT at diagnosis) anastrozole and 2/2 fatigue and feeling unwell. Started Wellbutrin. Started on Tamoxifen 11/2024, but stopped. Mammogram scheduled 4/25/25.      HPI: The patient is a 68 y.o. year old female with a new diagnosis of breast cancer. She initially presented due to abnormal mammogram.      4/23/24 Screening mammogram:  Right Ca++ at upper outer position     5/1/24 Right Diagnostic mammogram/ultrasound:   9:00 anterior depth grouped pleomorphic Ca++ spanning 4mm  By ultrasound: 9:00 cyst 7cmfn 0.4cm; 9:00 2cmfn intraductal heterogeneous mass with assoc Ca++ 0.5cm  No abnormal lymph nodes      5/8/24 Right 9:00 2cmfn biopsy:  "in situ (G2) and invasive duct carcinoma (G1), +/+/-, Ki67 14%     5/15/24 MRI Breasts:   No suspicious lesions left breast  Right 10:00 anterior irregular enhancement lateral adjacent to bx clip, 0.5cm with vague enhancement inferior to clip 0.9cm  No abnormal nodes     Followed by Dr. Gasca at [facility] for MGUS (asymptomatic)       Possibility of pregnancy: No  History of prior irradiation: As above  History of prior systemic anti-cancer therapy: No  History of collagen vascular disease: No  Implanted electronic device (pacer/defib/nerve stimulator): No    Review of Systems   Review of Systems   Constitutional:  Positive for malaise/fatigue. Negative for chills and fever.   Eyes:  Negative for double vision.   Gastrointestinal:  Negative for nausea and vomiting.   Skin:  Positive for rash.   Neurological:  Positive for dizziness (w/ anastrozole).       Social History:  Social History     Tobacco Use    Smoking status: Never    Smokeless tobacco: Never   Substance Use Topics    Alcohol use: Yes     Alcohol/week: 2.0 - 3.0 standard drinks of alcohol     Types: 2 - 3 Glasses of wine per week    Drug use: No       Family History:  Cancer-related family history includes Cancer in her father, paternal aunt, and paternal uncle.    Exam:  Vitals:    01/30/25 0932   BP: (!) 145/65   Pulse: 78   Resp: 16   Temp: 98.4 °F (36.9 °C)   SpO2: 99%   Weight: 60.3 kg (132 lb 15 oz)   Height: 5' 4" (1.626 m)     Constitutional: Pleasant 69 y.o. female in no acute distress.  Well nourished. Well groomed.   HEENT: Normocephalic and atraumatic   Lungs: No audible wheezing.  Normal effort.   Breast/Chest wall: Treated area with no significant skin changes (mild hyperpigmentation), no desquamation, no suspicious masses, no regional lymphadenopathy. Papular, somewhat vesicular eruption central chest between breasts.   Musculoskeletal: No gross MSK deformities. Ambulates independently  Skin: No rashes appreciated.   Psych: Alert and oriented " with appropriate mood and affect.  Neuro:   Grossly normal.      Assessment:  Recovering from acute radiation therapy-related changes well  No evidence of disease persistence or recurrence in treated region  ECOG: (0) Fully active, able to carry on all predisease performance without restriction    Plan:  Routine breast imaging in 4/2025  Follow up with OB/GYN to consider switching Wellbutrin to Effexor (for hot flashes)  Female/u with Derm re: rash (possibly 2/2 radiation therapy, but uncharacteristic location and timing)  Skincare/sun protection reviewed  Endocrine therapy per Med Onc  She was given our contact information, and she was told that she could call our clinic at anytime if she has any questions or concerns.  Follow up with other providers as directed

## 2025-04-25 ENCOUNTER — LAB VISIT (OUTPATIENT)
Dept: LAB | Facility: HOSPITAL | Age: 70
End: 2025-04-25
Attending: INTERNAL MEDICINE
Payer: MEDICARE

## 2025-04-25 DIAGNOSIS — R80.9 PROTEINURIA, UNSPECIFIED TYPE: ICD-10-CM

## 2025-04-25 DIAGNOSIS — Z12.31 ENCOUNTER FOR SCREENING MAMMOGRAM FOR MALIGNANT NEOPLASM OF BREAST: ICD-10-CM

## 2025-04-25 DIAGNOSIS — D69.6 THROMBOCYTOPENIA: ICD-10-CM

## 2025-04-25 DIAGNOSIS — C50.911 INVASIVE DUCTAL CARCINOMA OF BREAST, FEMALE, RIGHT: ICD-10-CM

## 2025-04-25 LAB
ABSOLUTE EOSINOPHIL (OHS): 0.09 K/UL
ABSOLUTE MONOCYTE (OHS): 0.37 K/UL (ref 0.3–1)
ABSOLUTE NEUTROPHIL COUNT (OHS): 2.56 K/UL (ref 1.8–7.7)
ALBUMIN SERPL BCP-MCNC: 4.3 G/DL (ref 3.5–5.2)
ALP SERPL-CCNC: 70 UNIT/L (ref 40–150)
ALT SERPL W/O P-5'-P-CCNC: 28 UNIT/L (ref 10–44)
ANION GAP (OHS): 12 MMOL/L (ref 8–16)
AST SERPL-CCNC: 23 UNIT/L (ref 11–45)
BASOPHILS # BLD AUTO: 0.04 K/UL
BASOPHILS NFR BLD AUTO: 1 %
BETA 2 MICROGLOBILIN (OHS): 2.2 UG/ML (ref 0–2.5)
BILIRUB SERPL-MCNC: 0.7 MG/DL (ref 0.1–1)
BUN SERPL-MCNC: 19 MG/DL (ref 8–23)
CALCIUM SERPL-MCNC: 10.8 MG/DL (ref 8.7–10.5)
CHLORIDE SERPL-SCNC: 105 MMOL/L (ref 95–110)
CO2 SERPL-SCNC: 20 MMOL/L (ref 23–29)
CREAT SERPL-MCNC: 0.9 MG/DL (ref 0.5–1.4)
CREAT UR-MCNC: 155 MG/DL (ref 15–325)
ERYTHROCYTE [DISTWIDTH] IN BLOOD BY AUTOMATED COUNT: 12.5 % (ref 11.5–14.5)
GFR SERPLBLD CREATININE-BSD FMLA CKD-EPI: >60 ML/MIN/1.73/M2
GLUCOSE SERPL-MCNC: 130 MG/DL (ref 70–110)
HCT VFR BLD AUTO: 37.2 % (ref 37–48.5)
HGB BLD-MCNC: 12.7 GM/DL (ref 12–16)
IMM GRANULOCYTES # BLD AUTO: 0.03 K/UL (ref 0–0.04)
IMM GRANULOCYTES NFR BLD AUTO: 0.7 % (ref 0–0.5)
LDH SERPL-CCNC: 179 U/L (ref 110–260)
LYMPHOCYTES # BLD AUTO: 1.1 K/UL (ref 1–4.8)
MCH RBC QN AUTO: 33.9 PG (ref 27–31)
MCHC RBC AUTO-ENTMCNC: 34.1 G/DL (ref 32–36)
MCV RBC AUTO: 99 FL (ref 82–98)
NUCLEATED RBC (/100WBC) (OHS): 0 /100 WBC
PLATELET # BLD AUTO: 108 K/UL (ref 150–450)
PMV BLD AUTO: 10.8 FL (ref 9.2–12.9)
POTASSIUM SERPL-SCNC: 4.3 MMOL/L (ref 3.5–5.1)
PROT SERPL-MCNC: 7.5 GM/DL (ref 6–8.4)
PROT UR-MCNC: 238 MG/DL
PROT/CREAT UR: 1.54 MG/G{CREAT}
RBC # BLD AUTO: 3.75 M/UL (ref 4–5.4)
RELATIVE EOSINOPHIL (OHS): 2.1 %
RELATIVE LYMPHOCYTE (OHS): 26.3 % (ref 18–48)
RELATIVE MONOCYTE (OHS): 8.8 % (ref 4–15)
RELATIVE NEUTROPHIL (OHS): 61.1 % (ref 38–73)
SODIUM SERPL-SCNC: 137 MMOL/L (ref 136–145)
WBC # BLD AUTO: 4.19 K/UL (ref 3.9–12.7)

## 2025-04-25 PROCEDURE — 83615 LACTATE (LD) (LDH) ENZYME: CPT | Mod: PN

## 2025-04-25 PROCEDURE — 84156 ASSAY OF PROTEIN URINE: CPT

## 2025-04-25 PROCEDURE — 83521 IG LIGHT CHAINS FREE EACH: CPT

## 2025-04-25 PROCEDURE — 82040 ASSAY OF SERUM ALBUMIN: CPT | Mod: PN

## 2025-04-25 PROCEDURE — 82232 ASSAY OF BETA-2 PROTEIN: CPT

## 2025-04-25 PROCEDURE — 84165 PROTEIN E-PHORESIS SERUM: CPT

## 2025-04-25 PROCEDURE — 85025 COMPLETE CBC W/AUTO DIFF WBC: CPT | Mod: PN

## 2025-04-28 ENCOUNTER — OFFICE VISIT (OUTPATIENT)
Dept: HEMATOLOGY/ONCOLOGY | Facility: CLINIC | Age: 70
End: 2025-04-28
Payer: MEDICARE

## 2025-04-28 VITALS
OXYGEN SATURATION: 97 % | HEART RATE: 82 BPM | SYSTOLIC BLOOD PRESSURE: 104 MMHG | WEIGHT: 131.63 LBS | HEIGHT: 64 IN | TEMPERATURE: 98 F | RESPIRATION RATE: 17 BRPM | DIASTOLIC BLOOD PRESSURE: 61 MMHG | BODY MASS INDEX: 22.47 KG/M2

## 2025-04-28 DIAGNOSIS — Z12.31 ENCOUNTER FOR SCREENING MAMMOGRAM FOR MALIGNANT NEOPLASM OF BREAST: ICD-10-CM

## 2025-04-28 DIAGNOSIS — C50.911 INVASIVE DUCTAL CARCINOMA OF BREAST, FEMALE, RIGHT: ICD-10-CM

## 2025-04-28 DIAGNOSIS — R80.9 PROTEINURIA, UNSPECIFIED TYPE: ICD-10-CM

## 2025-04-28 DIAGNOSIS — D69.6 THROMBOCYTOPENIA: ICD-10-CM

## 2025-04-28 DIAGNOSIS — D47.2 MGUS (MONOCLONAL GAMMOPATHY OF UNKNOWN SIGNIFICANCE): Primary | ICD-10-CM

## 2025-04-28 LAB
ALBUMIN, SPE (OHS): 4.38 G/DL (ref 3.35–5.55)
ALPHA 1 GLOB (OHS): 0.31 GM/DL (ref 0.17–0.41)
ALPHA 2 GLOB (OHS): 0.8 GM/DL (ref 0.43–0.99)
BETA GLOB (OHS): 0.85 GM/DL (ref 0.5–1.1)
GAMMA GLOBULIN (OHS): 0.75 GM/DL (ref 0.67–1.58)
KAPPA LC FREE SER-MCNC: 1.62 MG/L (ref 0.26–1.65)
KAPPA LC FREE/LAMBDA FREE SER: 2.43 MG/DL (ref 0.33–1.94)
LAMBDA LC FREE SERPL-MCNC: 1.5 MG/DL (ref 0.57–2.63)
PROT SERPL-MCNC: 7.1 GM/DL (ref 6–8.4)

## 2025-04-28 PROCEDURE — 99999 PR PBB SHADOW E&M-EST. PATIENT-LVL IV: CPT | Mod: PBBFAC,,, | Performed by: INTERNAL MEDICINE

## 2025-04-28 PROCEDURE — 99214 OFFICE O/P EST MOD 30 MIN: CPT | Mod: PBBFAC,PN | Performed by: INTERNAL MEDICINE

## 2025-04-28 NOTE — PROGRESS NOTES
Name: Nery Sandoval  MRN:  [id number]  :  1955 Age 69 y.o.  Date of Service: 2025    Reason for visit:  Nery Sandoval is a 69 y.o. female here regarding..    #Right Invasive Ductal Carcinoma   Date of Original Diagnosis: 24  Original Stage: pT1pN0 Stage 1A ER 93% GA 93% HER2 0/negative; Ki-67 13%, low-grade, Oncotype 2   Current Sites of Disease:  None  Current Goals of Therapy:  Curative  Current Therapy: Anastrozole (start: 24, self discontinued 24 )    Oncologic History/History of Present Illness:     Detected by screening mammogram. No symptoms.   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 Never smoker     24- lumpectomy w/ SLNB, pT1pN0  24- completed 5 FX RT    Interval hx: (2025)  -she has no breast concerns but is hoping to be able to get ultrasounds with her mammograms      PHYSICAL EXAMINATION:  There were no vitals taken for this visit.  Wt Readings from Last 3 Encounters:   25 57.2 kg (126 lb)   25 60.3 kg (132 lb 15 oz)   24 61.4 kg (135 lb 5.8 oz)     ECOG PERFORMANCE STATUS: 0  Physical Exam   General:  Well-appearing, nontoxic  Eyes:  Equal and round pupils, EOMI, no scleral icterus  Mouth:  No lesions, moist  Cardiovascular:  Warm, well-perfused, no peripheral edema  Lungs:  Unlabored on room air, no wheezing  Neurologic:  Awake, alert and oriented, participating in the exam  Psych:  Appropriate mood and affect  Skin:  Normal pallor, No rashes  Heme:  No petechiae, no purpura  Breasts: left breast normal without mass, skin or nipple changes or axillary nodes, surgical scars noted right breast with hyperpigmentation and seborrheic keratosis.       LABORATORY:  CBC  Lab Results    Component Value Date    WBC 4.19 04/25/2025    HGB 12.7 04/25/2025    HCT 37.2 04/25/2025    MCV 99 (H) 04/25/2025     (L) 04/25/2025     BMP  Lab Results   Component Value Date     04/25/2025    K 4.3 04/25/2025     04/25/2025    CO2 20 (L) 04/25/2025    BUN 19 04/25/2025    CREATININE 0.9 04/25/2025    CALCIUM 10.8 (H) 04/25/2025    ANIONGAP 12 04/25/2025    ESTGFRAFRICA >60 10/31/2017    EGFRNONAA >60 10/31/2017         PATHOLOGY:        RADIOLOGY:  DEXA scan 04/23/2024  Osteopenia      ASSESSMENT AND PLAN:  Nery Sandoval is a 69 y.o. female with...    #Right Invasive Ductal Carcinoma   Date of Original Diagnosis: 05/08/24  Original Stage: pT1pN0 Stage 1A ER 93% SC 93% HER2 0/negative; Ki-67 13%, low-grade, Oncotype 2   Current Sites of Disease:  None  Current Goals of Therapy:  Curative  Current Therapy: Anastrozole (start: 08/29/24, self discontinued 11/6/24 )    No chemo recommended based on oncotype     I counseled the patient regarding the stage of her disease in the role of adjuvant endocrine therapy therapy for hormone positive breast cancer.  I counseled the patient regarding the proposed endocrine therapy of anastrozole 1 mg once daily for a duration of 5 years to prevent breast cancer recurrence in her ipsilateral breast and protect the contralateral breast.  I counseled the patient regarding the typical side effects of anastrozole including joint stiffness, hot flashes, vaginal dryness, bone demineralization.  I counseled the patient regarding the possible side effects of Tamoxifen including hot flashes, thromboembolism, and uterine cancer. Counseled patient to continue routine annual GYN visits.         04/28/2025--S/p lumpectomy, needs annual mammogram (with US per patient request) in April. Mammogram w/ US competed 4/2025 BIRADS 2    #Bone Health  - baseline DEXA completed April 2024 with osteopenia, plans to follow up with primary care regarding bone health therapy    -not currently on endocrine therapy    # hypertension-on losartan, controlled, per PCP    #Proteinuria : follows with Dr. Kebede   #MGUS?- repeating myeloma labs anually; K/L ratio normal; beta 2 mircroglobulin nml; LSH nml; SPEP Faint paraprotein in gamma, approximately 0.18 g/dL   #Thrombocytopenia - has had platelet clumping on prior draws,  will need to draw next cbc in a citrated tube     Zahra Carver M.D.   Oncology and Benign Hematology      Route Chart for Scheduling    Med Onc Chart Routing      Follow up with physician 1 year. with cbc (citrated tube due to platelet clumping);cmp, and MGUS labs 1 wee prior; appt  post mammogram/US for April 2026, in a gown for visit   Follow up with [facility] 6 months. in a gown for exam, no labs (cancel appt with kanchan in July)   Infusion scheduling note    Injection scheduling note    Labs    Imaging    Pharmacy appointment    Other referrals                Therapy Plan Information  DENOSUMAB (PROLIA) Q6M for Senile osteoporosis, noted on 10/30/2024  denosumab (PROLIA) injection 60 mg  60 mg, Subcutaneous, Every 26 weeks      No therapy plan of the specified type found.    No therapy plan of the specified type found.

## 2025-04-29 LAB — PATHOLOGIST REVIEW - SPE (OHS): NORMAL

## 2025-08-19 ENCOUNTER — TELEPHONE (OUTPATIENT)
Dept: HEMATOLOGY/ONCOLOGY | Facility: CLINIC | Age: 70
End: 2025-08-19
Payer: MEDICARE